# Patient Record
Sex: MALE | Race: OTHER | NOT HISPANIC OR LATINO | Employment: FULL TIME | URBAN - METROPOLITAN AREA
[De-identification: names, ages, dates, MRNs, and addresses within clinical notes are randomized per-mention and may not be internally consistent; named-entity substitution may affect disease eponyms.]

---

## 2017-01-13 ENCOUNTER — OFFICE VISIT (OUTPATIENT)
Dept: MEDICAL GROUP | Facility: PHYSICIAN GROUP | Age: 37
End: 2017-01-13
Payer: COMMERCIAL

## 2017-01-13 VITALS
HEART RATE: 72 BPM | OXYGEN SATURATION: 96 % | HEIGHT: 67 IN | RESPIRATION RATE: 16 BRPM | TEMPERATURE: 98.4 F | BODY MASS INDEX: 29.82 KG/M2 | WEIGHT: 190 LBS | DIASTOLIC BLOOD PRESSURE: 88 MMHG | SYSTOLIC BLOOD PRESSURE: 114 MMHG

## 2017-01-13 DIAGNOSIS — M79.672 PAIN OF LEFT HEEL: ICD-10-CM

## 2017-01-13 DIAGNOSIS — M72.2 PLANTAR FASCIITIS OF LEFT FOOT: ICD-10-CM

## 2017-01-13 PROCEDURE — 99213 OFFICE O/P EST LOW 20 MIN: CPT | Performed by: INTERNAL MEDICINE

## 2017-01-13 NOTE — MR AVS SNAPSHOT
"Tej Leigh Santi   2017 3:25 PM   Office Visit   MRN: [id number]    Department:  [facility]   Dept Phone:  [phone]    Description:  Male : 1980   Provider:  Haim Barton D.O.           Reason for Visit     Foot Swelling Lt heel swelling x2 weeks      Allergies as of 2017     No Known Allergies      You were diagnosed with     Pain of left heel   [361980]       Plantar fasciitis of left foot   [613672]         Vital Signs     Blood Pressure Pulse Temperature Respirations Height Weight    114/88 mmHg 72 36.9 °C (98.4 °F) 16 1.689 m (5' 6.5\") 86.183 kg (190 lb)    Body Mass Index Oxygen Saturation Smoking Status             30.21 kg/m2 96% Former Smoker         Basic Information     Date Of Birth Sex Race Ethnicity Preferred Language    1980 Male  Non- English      Problem List              ICD-10-CM Priority Class Noted - Resolved    Gout M10.9   2011 - Present    Perforation 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 Review all of your home medications and newly ordered medications with your provider and/or pharmacist. Follow medication instructions as directed by your provider and/or pharmacist. Please keep your medication list with you and share with your provider. Update the information when medications are discontinued, doses are changed, or new medications (including over-the-counter products) are added; and carry medication information at all times in the event of emergency situations     Allergies:  No Known " Allergies          Medications  Valid as of: January 13, 2017 -  3:58 PM    Generic Name Brand Name Tablet Size Instructions for use    Allopurinol (Tab) ZYLOPRIM 100 MG Take 2 Tabs by mouth every day.        Indomethacin (Cap) INDOCIN 50 MG TAKE 1 CAPSULE BY MOUTH 2 TIMES A DAY WITH MEALS.        .                 Medicines prescribed today were sent to:     [facility][id number] - [place] - [address] [place] [address]    Phone: [phone] Fax: [phone]    Open 24 Hours?: No      Medication refill instructions:       If your prescription bottle indicates you have medication refills left, it is not necessary to call your provider’s office. Please contact your pharmacy and they will refill your medication.    If your prescription bottle indicates you do not have any refills left, you may request refills at any time through one of the following ways: The online [employer] system (except Urgent Care), by calling your provider’s office, or by asking your pharmacy to contact your provider’s office with a refill request. Medication refills are processed only during regular business hours and may not be available until the next business day. Your provider may request additional information or to have a follow-up visit with you prior to refilling your medication.   *Please Note: Medication refills are assigned a new Rx number when refilled electronically. Your pharmacy may indicate that no refills were authorized even though a new prescription for the same medication is available at the pharmacy. Please request the medicine by name with the pharmacy before contacting your provider for a refill.        Instructions    Plantar Fasciitis  Plantar fasciitis is a painful foot condition that affects the heel. It occurs when the band of tissue that connects the toes to the heel bone (plantar fascia) becomes irritated. This can happen after exercising too much or doing other repetitive activities (overuse injury).  The pain from plantar fasciitis can range from mild irritation to severe pain that makes it difficult for you to walk or move. The pain is usually worse in the morning or after you have been sitting or lying down for a while.  CAUSES  This condition may be caused by:  · Standing for long periods of time.  · Wearing shoes that do not fit.  · Doing high-impact activities, including running, aerobics, and ballet.  · Being overweight.  · Having an abnormal way of walking (gait).  · Having tight calf muscles.  · Having high arches in your feet.  · Starting a new athletic activity.  SYMPTOMS  The main symptom of this condition is heel pain. Other symptoms include:  · Pain that gets worse after activity or exercise.  · Pain that is worse in the morning or after resting.  · Pain that goes away after you walk for a few minutes.  DIAGNOSIS  This condition may be diagnosed based on your signs and symptoms. Your health care provider will also do a physical exam to check for:  · A tender area on the bottom of your foot.  · A high arch in your foot.  · Pain when you move your foot.  · Difficulty moving your foot.  You may also need to have imaging studies to confirm the diagnosis. These can include:  · X-rays.  · Ultrasound.  · MRI.  TREATMENT   Treatment for plantar fasciitis depends on the severity of the condition. Your treatment may include:  · Rest, ice, and over-the-counter pain medicines to manage your pain.  · Exercises to stretch your calves and your plantar fascia.  · A splint that holds your foot in a stretched, upward position while you sleep (night splint).  · Physical therapy to relieve symptoms and prevent problems in the future.  · Cortisone injections to relieve severe pain.  · Extracorporeal shockwave therapy (ESWT) to stimulate damaged plantar fascia with electrical impulses. It is often used as a last resort before surgery.  · Surgery, if other treatments have not worked after 12 months.  HOME CARE  INSTRUCTIONS  · Take medicines only as directed by your health care provider.  · Avoid activities that cause pain.  · Roll the bottom of your foot over a bag of ice or a bottle of cold water. Do this for 20 minutes, 3-4 times a day.  · Perform simple stretches as directed by your health care provider.  · Try wearing athletic shoes with air-sole or gel-sole cushions or soft shoe inserts.  · Wear a night splint while sleeping, if directed by your health care provider.  · Keep all follow-up appointments with your health care provider.  PREVENTION   · Do not perform exercises or activities that cause heel pain.  · Consider finding low-impact activities if you continue to have problems.  · Lose weight if you need to.  The best way to prevent plantar fasciitis is to avoid the activities that aggravate your plantar fascia.  SEEK MEDICAL CARE IF:  · Your symptoms do not go away after treatment with home care measures.  · Your pain gets worse.  · Your pain affects your ability to move or do your daily activities.     This information is not intended to replace advice given to you by your health care provider. Make sure you discuss any questions you have with your health care provider.     Document Released: 09/12/2002 Document Revised: 05/03/2016 Document Reviewed: 10/28/2015  [employer] Interactive Patient Education ©2016 [employer]vier Inc.            PoachIt Access Code: [email or web address]  Expires: 1/21/2017  9:31 AM    PoachIt  A secure, online tool to manage your health information     [employer] PoachIt® is a secure, online tool that connects you to your personalized health information from the privacy of your home -- day or night - making it very easy for you to manage your healthcare. Once the activation process is completed, you can even access your medical information using the PoachIt elmer, which is available for free in the Apple Elmer store or Google Play store.     PoachIt provides the following levels of access (as  shown below):   My Chart Features   [facility]  Urgent  Care [facility]  Primary Care  Doctor   Email your healthcare team securely and privately 24/7 X X X    Manage appointments: schedule your next appointment; view details of past/upcoming appointments X      Request prescription refills. X      View recent personal medical records, including lab and immunizations X X X X   View health record, including health history, allergies, medications X X X X   Read reports about your outpatient visits, procedures, consult and ER notes X X X X   See your discharge summary, which is a recap of your hospital and/or ER visit that includes your diagnosis, lab results, and care plan. X X       How to register for Kayo technology:  1. Go to  [email or web address].  2. Click on the Sign Up Now box, which takes you to the New Member Sign Up page. You will need to provide the following information:  a. Enter your Kayo technology Access Code exactly as it appears at the top of this page. (You will not need to use this code after you’ve completed the sign-up process. If you do not sign up before the expiration date, you must request a new code.)   b. Enter your date of birth.   c. Enter your home email address.   d. Click Submit, and follow the next screen’s instructions.  3. Create a Kayo technology ID. This will be your Kayo technology login ID and cannot be changed, so think of one that is secure and easy to remember.  4. Create a Kayo technology password. You can change your password at any time.  5. Enter your Password Reset Question and Answer. This can be used at a later time if you forget your password.   6. Enter your e-mail address. This allows you to receive e-mail notifications when new information is available in Kayo technology.  7. Click Sign Up. You can now view your health information.    For assistance activating your Kayo technology account, call [phone]

## 2017-01-13 NOTE — PATIENT INSTRUCTIONS
Plantar Fasciitis  Plantar fasciitis is a painful foot condition that affects the heel. It occurs when the band of tissue that connects the toes to the heel bone (plantar fascia) becomes irritated. This can happen after exercising too much or doing other repetitive activities (overuse injury). The pain from plantar fasciitis can range from mild irritation to severe pain that makes it difficult for you to walk or move. The pain is usually worse in the morning or after you have been sitting or lying down for a while.  CAUSES  This condition may be caused by:  · Standing for long periods of time.  · Wearing shoes that do not fit.  · Doing high-impact activities, including running, aerobics, and ballet.  · Being overweight.  · Having an abnormal way of walking (gait).  · Having tight calf muscles.  · Having high arches in your feet.  · Starting a new athletic activity.  SYMPTOMS  The main symptom of this condition is heel pain. Other symptoms include:  · Pain that gets worse after activity or exercise.  · Pain that is worse in the morning or after resting.  · Pain that goes away after you walk for a few minutes.  DIAGNOSIS  This condition may be diagnosed based on your signs and symptoms. Your health care provider will also do a physical exam to check for:  · A tender area on the bottom of your foot.  · A high arch in your foot.  · Pain when you move your foot.  · Difficulty moving your foot.  You may also need to have imaging studies to confirm the diagnosis. These can include:  · X-rays.  · Ultrasound.  · MRI.  TREATMENT   Treatment for plantar fasciitis depends on the severity of the condition. Your treatment may include:  · Rest, ice, and over-the-counter pain medicines to manage your pain.  · Exercises to stretch your calves and your plantar fascia.  · A splint that holds your foot in a stretched, upward position while you sleep (night splint).  · Physical therapy to relieve symptoms and prevent problems in the  future.  · Cortisone injections to relieve severe pain.  · Extracorporeal shockwave therapy (ESWT) to stimulate damaged plantar fascia with electrical impulses. It is often used as a last resort before surgery.  · Surgery, if other treatments have not worked after 12 months.  HOME CARE INSTRUCTIONS  · Take medicines only as directed by your health care provider.  · Avoid activities that cause pain.  · Roll the bottom of your foot over a bag of ice or a bottle of cold water. Do this for 20 minutes, 3-4 times a day.  · Perform simple stretches as directed by your health care provider.  · Try wearing athletic shoes with air-sole or gel-sole cushions or soft shoe inserts.  · Wear a night splint while sleeping, if directed by your health care provider.  · Keep all follow-up appointments with your health care provider.  PREVENTION   · Do not perform exercises or activities that cause heel pain.  · Consider finding low-impact activities if you continue to have problems.  · Lose weight if you need to.  The best way to prevent plantar fasciitis is to avoid the activities that aggravate your plantar fascia.  SEEK MEDICAL CARE IF:  · Your symptoms do not go away after treatment with home care measures.  · Your pain gets worse.  · Your pain affects your ability to move or do your daily activities.     This information is not intended to replace advice given to you by your health care provider. Make sure you discuss any questions you have with your health care provider.     Document Released: 09/12/2002 Document Revised: 05/03/2016 Document Reviewed: 10/28/2015  [employer] Interactive Patient Education ©2016 [employer]

## 2017-01-13 NOTE — PROGRESS NOTES
"Subjective:   Tej Hancock is a 36 y.o. male here today for multiple problems as listed below.      Patient complains of left heel pain that started about 2 weeks ago. Pain is 7 out of 10 in severity and worse in the mornings after getting out of bed. Throughout the day pain is worse when he puts more weight on the heel. No history of traumatic injury. Reports that it feels like the heel swollen but he does not see any increased size in the heel. No erythema or rash. No warmth of the joint. Does not feel like a gout exacerbation which she has had in the past but on the great toe. His shoes are over one year old. With his current job, he does stand on his feet for long periods of time. Patient has been massaging the heel with some improvement. He has not taken any over-the-counter medications.    Current medicines (including changes today)  Current Outpatient Prescriptions   Medication Sig Dispense Refill   • allopurinol (ZYLOPRIM) 100 MG Tab Take 2 Tabs by mouth every day. 60 Tab 00   • indomethacin (INDOCIN) 50 MG CAPS TAKE 1 CAPSULE BY MOUTH 2 TIMES A DAY WITH MEALS. 60 Cap 0     No current facility-administered medications for this visit.     He  has a past medical history of GOUT (12/9/2011) and Hyperlipidemia. He also has no past medical history of Hypertension.    ROS   No chest pain, no shortness of breath, no abdominal pain, no diarrhea/constipation, no urinary symptoms.     Objective:     Blood pressure 114/88, pulse 72, temperature 36.9 °C (98.4 °F), resp. rate 16, height 1.689 m (5' 6.5\"), weight 86.183 kg (190 lb), SpO2 96 %. Body mass index is 30.21 kg/(m^2).   Physical Exam:  Alert, oriented in no acute distress.  Eye contact is good, speech goal directed, affect calm  HEENT: conjunctiva non-injected, sclera non-icteric.  Ext: no edema, color normal, vascularity normal, temperature normal, no tenderness to palpation of heel. Increased heel pain with left foot flexion    Assessment and Plan: "   The following treatment plan was discussed   1. Pain of left heel     2. Plantar fasciitis of left foot       Discussed conservative measures including NSAIDs, ice and rest, and stretching exercises.  More information provided and AVS. Discussed modifying aggravating activities-patient will be changing job duties in the next few weeks which will allow him to be more active and not standing on his feet for long periods of time. He will also be getting new shoes provided by his employment. Also discussed mechanical therapies such as taping and arch support when obtaining his new shoes. Consider podiatry referral if symptoms not improving or persisting.    Followup: Return if symptoms worsen or fail to improve, for follow-up with PCP.         Please note that dictation has been dictated using voice recognition soft ware. I have made every reasonable attempt to correct obvious errors, but I expect that there are errors of grammar and possibly content that I did not discover before finalizing the note.

## 2017-07-07 ENCOUNTER — OFFICE VISIT (OUTPATIENT)
Dept: MEDICAL GROUP | Facility: PHYSICIAN GROUP | Age: 37
End: 2017-07-07
Payer: COMMERCIAL

## 2017-07-07 VITALS
TEMPERATURE: 98.1 F | HEART RATE: 90 BPM | HEIGHT: 67 IN | BODY MASS INDEX: 29.13 KG/M2 | WEIGHT: 185.63 LBS | OXYGEN SATURATION: 94 % | DIASTOLIC BLOOD PRESSURE: 74 MMHG | RESPIRATION RATE: 16 BRPM | SYSTOLIC BLOOD PRESSURE: 122 MMHG

## 2017-07-07 DIAGNOSIS — M10.9 GOUT OF FOOT, UNSPECIFIED CAUSE, UNSPECIFIED CHRONICITY, UNSPECIFIED LATERALITY: ICD-10-CM

## 2017-07-07 DIAGNOSIS — M72.2 PLANTAR FASCIITIS, BILATERAL: ICD-10-CM

## 2017-07-07 DIAGNOSIS — B35.4 TINEA CORPORIS: ICD-10-CM

## 2017-07-07 DIAGNOSIS — E78.00 PURE HYPERCHOLESTEROLEMIA: ICD-10-CM

## 2017-07-07 DIAGNOSIS — M10.9 GOUT, UNSPECIFIED CAUSE, UNSPECIFIED CHRONICITY, UNSPECIFIED SITE: ICD-10-CM

## 2017-07-07 PROCEDURE — 99214 OFFICE O/P EST MOD 30 MIN: CPT | Performed by: FAMILY MEDICINE

## 2017-07-07 RX ORDER — CLOTRIMAZOLE AND BETAMETHASONE DIPROPIONATE 10; .64 MG/G; MG/G
CREAM TOPICAL
Qty: 1 TUBE | Refills: 2 | Status: SHIPPED | OUTPATIENT
Start: 2017-07-07 | End: 2017-12-27

## 2017-07-07 RX ORDER — ALLOPURINOL 100 MG/1
100 TABLET ORAL DAILY
Qty: 90 TAB | Refills: 3 | Status: SHIPPED | OUTPATIENT
Start: 2017-07-07 | End: 2017-09-13

## 2017-07-07 NOTE — MR AVS SNAPSHOT
"        Tej Abraham Santi   2017 4:20 PM   Office Visit   MRN: [id number]    Department:  [facility]   Dept Phone:  [phone]    Description:  Male : 1980   Provider:  Kira Long M.D.           Reason for Visit     Other requesting labs to check urice acid     Rash left leg & foot       Allergies as of 2017     No Known Allergies      You were diagnosed with     Tinea corporis   [670084]       Plantar fasciitis, bilateral   [392049]       Gout of foot, unspecified cause, unspecified chronicity, unspecified laterality   [8927593]       Pure hypercholesterolemia   [272.0.ICD-9-CM]       Gout, unspecified cause, unspecified chronicity, unspecified site   [8768603]         Vital Signs     Blood Pressure Pulse Temperature Respirations Height Weight    122/74 mmHg 90 36.7 °C (98.1 °F) 16 1.708 m (5' 7.24\") 84.2 kg (185 lb 10 oz)    Body Mass Index Oxygen Saturation Smoking Status             28.86 kg/m2 94% Former Smoker         Basic Information     Date Of Birth Sex Race Ethnicity Preferred Language    1980 Male  Non- English      Your appointments     2018  4:00 PM   Established Patient with Kira Long M.D.   [facility] (--)    [address]  [address]  [facility] [address] [phone]           You will be receiving a confirmation call a few days before your appointment from our automated call confirmation system.              Problem List              ICD-10-CM Priority Class Noted - Resolved    Gout M10.9   2011 - Present    Hyperlipidemia E78.5   10/22/2015 - Present    Tinea corporis B35.4   2017 - Present    Plantar fasciitis, bilateral M72.2   2017 - Present      Health Maintenance        Date Due Completion Dates    IMM INFLUENZA (1) 2017 10/15/2015    IMM DTaP/Tdap/Td Vaccine (2 - Td) 2025            Current Immunizations     Influenza TIV (IM) 10/15/2015    Tdap Vaccine 2015      Below and/or " attached are the medications your provider expects you to take. Review all of your home medications and newly ordered medications with your provider and/or pharmacist. Follow medication instructions as directed by your provider and/or pharmacist. Please keep your medication list with you and share with your provider. Update the information when medications are discontinued, doses are changed, or new medications (including over-the-counter products) are added; and carry medication information at all times in the event of emergency situations     Allergies:  No Known Allergies          Medications  Valid as of: July 07, 2017 -  4:50 PM    Generic Name Brand Name Tablet Size Instructions for use    Allopurinol (Tab) ZYLOPRIM 100 MG Take 1 Tab by mouth every day.        Clotrimazole-Betamethasone (Cream) LOTRISONE 1-0.05 % Apply thin layer to rashes twice a day until resolved.        Indomethacin (Cap) INDOCIN 50 MG TAKE 1 CAPSULE BY MOUTH 2 TIMES A DAY WITH MEALS.        .                 Medicines prescribed today were sent to:     [id number] - [place] - [address]    [address] [place] [address]    Phone: [phone] Fax: [phone]    Open 24 Hours?: No      Medication refill instructions:       If your prescription bottle indicates you have medication refills left, it is not necessary to call your provider’s office. Please contact your pharmacy and they will refill your medication.    If your prescription bottle indicates you do not have any refills left, you may request refills at any time through one of the following ways: The online [employer] system (except Urgent Care), by calling your provider’s office, or by asking your pharmacy to contact your provider’s office with a refill request. Medication refills are processed only during regular business hours and may not be available until the next business day. Your provider may request additional information or to have a follow-up visit with you prior to  refilling your medication.   *Please Note: Medication refills are assigned a new Rx number when refilled electronically. Your pharmacy may indicate that no refills were authorized even though a new prescription for the same medication is available at the pharmacy. Please request the medicine by name with the pharmacy before contacting your provider for a refill.        Your To Do List     Future Labs/Procedures Complete By Expires    LIPID PROFILE  As directed 7/8/2018    URIC ACID  As directed 7/8/2018      Instructions    Body Ringworm  Ringworm (tinea corporis) is a fungal infection of the skin on the body. This infection is not caused by worms, but is actually caused by a fungus. Fungus normally lives on the top of your skin and can be useful. However, in the case of ringworms, the fungus grows out of control and causes a skin infection. It can involve any area of skin on the body and can spread easily from one person to another (contagious). Ringworm is a common problem for children, but it can affect adults as well. Ringworm is also often found in athletes, especially wrestlers who share equipment and mats.   CAUSES   Ringworm of the body is caused by a fungus called dermatophyte. It can spread by:  · Touching other people who are infected.  · Touching infected pets.  · Touching or sharing objects that have been in contact with the infected person or pet (hats, taveras, towels, clothing, sports equipment).  SYMPTOMS   · Itchy, raised red spots and bumps on the skin.  · Ring-shaped rash.  · Redness near the border of the rash with a clear center.  · Dry and scaly skin on or around the rash.  Not every person develops a ring-shaped rash. Some develop only the red, scaly patches.  DIAGNOSIS   Most often, ringworm can be diagnosed by performing a skin exam. Your caregiver may choose to take a skin scraping from the affected area. The sample will be examined under the microscope to see if the fungus is present.      TREATMENT   Body ringworm may be treated with a topical antifungal cream or ointment. Sometimes, an antifungal shampoo that can be used on your body is prescribed. You may be prescribed antifungal medicines to take by mouth if your ringworm is severe, keeps coming back, or lasts a long time.   HOME CARE INSTRUCTIONS   · Only take over-the-counter or prescription medicines as directed by your caregiver.  · Wash the infected area and dry it completely before applying your cream or ointment.  · When using antifungal shampoo to treat the ringworm, leave the shampoo on the body for 3-5 minutes before rinsing.     · Wear loose clothing to stop clothes from rubbing and irritating the rash.  · Wash or change your bed sheets every night while you have the rash.  · Have your pet treated by your  if it has the same infection.  To prevent ringworm:   · Practice good hygiene.  · Wear sandals or shoes in public places and showers.  · Do not share personal items with others.  · Avoid touching red patches of skin on other people.  · Avoid touching pets that have bald spots or wash your hands after doing so.  SEEK MEDICAL CARE IF:   · Your rash continues to spread after 7 days of treatment.  · Your rash is not gone in 4 weeks.  · The area around your rash becomes red, warm, tender, and swollen.     This information is not intended to replace advice given to you by your health care provider. Make sure you discuss any questions you have with your health care provider.     Document Released: 12/15/2001 Document Revised: 09/11/2013 Document Reviewed: 07/01/2013  [employer] Interactive Patient Education ©2016 Elsevier Inc.            MetaPackt Access Code: [email or web address]  Expires: 7/14/2017  4:42 PM    [employer]  A secure, online tool to manage your health information     [employer]® is a secure, online tool that connects you to your personalized health information from the privacy of your home -- day or night -  making it very easy for you to manage your healthcare. Once the activation process is completed, you can even access your medical information using the MobilePro elmer, which is available for free in the Apple Elmer store or Google Play store.     MobilePro provides the following levels of access (as shown below):   My Chart Features   [facility] Doctor [facility]  Specialists [facility]  Urgent  Care Non-[facility]  Primary Care  Doctor   Email your healthcare team securely and privately 24/7 X X X    Manage appointments: schedule your next appointment; view details of past/upcoming appointments X      Request prescription refills. X      View recent personal medical records, including lab and immunizations X X X X   View health record, including health history, allergies, medications X X X X   Read reports about your outpatient visits, procedures, consult and ER notes X X X X   See your discharge summary, which is a recap of your hospital and/or ER visit that includes your diagnosis, lab results, and care plan. X X       How to register for MobilePro:  1. Go to  [email or web address].  2. Click on the Sign Up Now box, which takes you to the New Member Sign Up page. You will need to provide the following information:  a. Enter your MobilePro Access Code exactly as it appears at the top of this page. (You will not need to use this code after you’ve completed the sign-up process. If you do not sign up before the expiration date, you must request a new code.)   b. Enter your date of birth.   c. Enter your home email address.   d. Click Submit, and follow the next screen’s instructions.  3. Create a MobilePro ID. This will be your MobilePro login ID and cannot be changed, so think of one that is secure and easy to remember.  4. Create a MobilePro password. You can change your password at any time.  5. Enter your Password Reset Question and Answer. This can be used at a later time if you forget your password.   6. Enter your e-mail  address. This allows you to receive e-mail notifications when new information is available in BusyFlow.  7. Click Sign Up. You can now view your health information.    For assistance activating your BusyFlow account, call [phone]

## 2017-07-07 NOTE — PATIENT INSTRUCTIONS
Body Ringworm  Ringworm (tinea corporis) is a fungal infection of the skin on the body. This infection is not caused by worms, but is actually caused by a fungus. Fungus normally lives on the top of your skin and can be useful. However, in the case of ringworms, the fungus grows out of control and causes a skin infection. It can involve any area of skin on the body and can spread easily from one person to another (contagious). Ringworm is a common problem for children, but it can affect adults as well. Ringworm is also often found in athletes, especially wrestlers who share equipment and mats.   CAUSES   Ringworm of the body is caused by a fungus called dermatophyte. It can spread by:  · Touching other people who are infected.  · Touching infected pets.  · Touching or sharing objects that have been in contact with the infected person or pet (hats, taveras, towels, clothing, sports equipment).  SYMPTOMS   · Itchy, raised red spots and bumps on the skin.  · Ring-shaped rash.  · Redness near the border of the rash with a clear center.  · Dry and scaly skin on or around the rash.  Not every person develops a ring-shaped rash. Some develop only the red, scaly patches.  DIAGNOSIS   Most often, ringworm can be diagnosed by performing a skin exam. Your caregiver may choose to take a skin scraping from the affected area. The sample will be examined under the microscope to see if the fungus is present.   TREATMENT   Body ringworm may be treated with a topical antifungal cream or ointment. Sometimes, an antifungal shampoo that can be used on your body is prescribed. You may be prescribed antifungal medicines to take by mouth if your ringworm is severe, keeps coming back, or lasts a long time.   HOME CARE INSTRUCTIONS   · Only take over-the-counter or prescription medicines as directed by your caregiver.  · Wash the infected area and dry it completely before applying your cream or ointment.  · When using antifungal shampoo to  treat the ringworm, leave the shampoo on the body for 3-5 minutes before rinsing.     · Wear loose clothing to stop clothes from rubbing and irritating the rash.  · Wash or change your bed sheets every night while you have the rash.  · Have your pet treated by your  if it has the same infection.  To prevent ringworm:   · Practice good hygiene.  · Wear sandals or shoes in public places and showers.  · Do not share personal items with others.  · Avoid touching red patches of skin on other people.  · Avoid touching pets that have bald spots or wash your hands after doing so.  SEEK MEDICAL CARE IF:   · Your rash continues to spread after 7 days of treatment.  · Your rash is not gone in 4 weeks.  · The area around your rash becomes red, warm, tender, and swollen.     This information is not intended to replace advice given to you by your health care provider. Make sure you discuss any questions you have with your health care provider.     Document Released: 12/15/2001 Document Revised: 09/11/2013 Document Reviewed: 07/01/2013  Elsevier Interactive Patient Education ©2016 Elsevier Inc.

## 2017-07-08 NOTE — ASSESSMENT & PLAN NOTE
"Patient has had a rash on the top of his right foot for quite a while. He tells me that it has been there \"for months.\" Recently, he noticed a similar rash on his right shin. It is itchy at times. No other rashes. He mentions that his feet are sweaty at work and there is sometimes an odor. He has not tried any treatments yet.  "

## 2017-07-08 NOTE — PROGRESS NOTES
"Tej Hancock is a 36 y.o. male here to establish care and discuss rash.    HPI:  Tej is a pleasant 36-year-old male here to establish care. Our office is down the street from his home. 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    Tinea corporis  Patient has had a rash on the top of his right foot for quite a while. He tells me that it has been there \"for months.\" Recently, he noticed a similar rash on his right shin. It is itchy at times. No other rashes. He mentions that his feet are sweaty at work and there is sometimes an odor. He has not tried any treatments yet.    Plantar fasciitis, bilateral  Patient has a several day history of bilateral heel pain. Worse in the morning. Improves throughout the day. Also worse with activity. Patient has tried over-the-counter medications without good results.    Gout  Stable. Currently taking allopurinol 100 mg every once in a while. He tells me that instead of taking it daily, he will take it when he feels \"tingling in my joints.\" Denies recent flare-ups. Denies side effects from medication.    Hyperlipidemia  Noted on previous lab results. Not on medications. No personal history of heart attack or stroke.    Current medicines (including changes today)  Current Outpatient Prescriptions   Medication Sig Dispense Refill   • clotrimazole-betamethasone (LOTRISONE) 1-0.05 % Cream Apply thin layer to rashes twice a day until resolved. 1 Tube 2   • allopurinol (ZYLOPRIM) 100 MG Tab Take 1 Tab by mouth every day. 90 Tab 3   • indomethacin (INDOCIN) 50 MG CAPS TAKE 1 CAPSULE BY MOUTH 2 TIMES A DAY WITH MEALS. 60 Cap 0     No current facility-administered medications for this visit.     He  has a past medical history of GOUT (12/9/2011) and Hyperlipidemia. He also has no past medical history of Hypertension.  He  has past surgical history that includes open reduction and tympanoplasty (8/28/2012).  Social History   Substance Use Topics   • Smoking status: " "Former Smoker -- 0.50 packs/day for 20 years     Types: Cigarettes     Quit date: 10/22/2013   • Smokeless tobacco: Never Used      Comment: 1 pack every 2 days   • Alcohol Use: No     Social History     Social History Narrative     Family History   Problem Relation Age of Onset   • Stroke Maternal Grandfather    • Cancer Neg Hx    • Diabetes Neg Hx    • Other Brother    • Other Brother      Family Status   Relation Status Death Age   • Maternal Grandfather     • Brother Alive    • Brother Alive    • Mother Alive    • Father  50     unknown   • Sister Alive    • Son Alive    • Maternal Grandmother     • Paternal Grandmother     • Paternal Grandfather       ROS  Constitutional: Negative for fever, chills and malaise/fatigue.   HENT: Negative for congestion.    Eyes: Negative for pain.   Respiratory: Negative for cough and shortness of breath.    Cardiovascular: Negative for leg swelling.   Gastrointestinal: Negative for nausea, vomiting, abdominal pain and diarrhea.   Genitourinary: Negative for dysuria and hematuria.   Skin: See HPI.  Neurological: Negative for dizziness, focal weakness and headaches.   Endo/Heme/Allergies: Does not bruise/bleed easily.   Psychiatric/Behavioral: Negative for depression.  The patient is not nervous/anxious.       Objective:     Physical Exam:  Blood pressure 122/74, pulse 90, temperature 36.7 °C (98.1 °F), resp. rate 16, height 1.708 m (5' 7.24\"), weight 84.2 kg (185 lb 10 oz), SpO2 94 %. Body mass index is 28.86 kg/(m^2).  Constitutional: Alert, no distress.  Skin: Warm, dry, good turgor, on dorsum of right foot there is a quarter-sized erythematous rash with elevated borders, on right shin there is a palm-sized maculopapular rash.  Eye: Equal, round and reactive, conjunctiva clear, lids normal.  ENMT: TM's clear bilaterally, lips without lesions, good dentition, oropharynx clear.  Neck: Trachea midline, no masses, no thyromegaly. No cervical " or supraclavicular lymphadenopathy.  Respiratory: Unlabored respiratory effort, lungs clear to auscultation, no wheezes, no ronchi.  Cardiovascular: Normal S1, S2, no murmur, no edema.  Abdomen: Soft, non-tender, no masses, no hepatosplenomegaly.  Psych: Alert and oriented x3, normal affect and mood.    Assessment and Plan:     1. Tinea corporis  Fungal-appearing rash on exam. Anti-fungal with topical steroid prescribed. Continue to monitor.  - clotrimazole-betamethasone (LOTRISONE) 1-0.05 % Cream; Apply thin layer to rashes twice a day until resolved.  Dispense: 1 Tube; Refill: 2    2. Plantar fasciitis, bilateral  New problem for the patient. Most likely plantar fasciitis given the symptoms. Will refer to physical therapy or podiatry if needed. Gave the patient handout with regard to ice and stretching exercises. Monitor.    3. Gout of foot, unspecified cause, unspecified chronicity, unspecified laterality  Chronic and stable. No recent flares. Check uric acid. Depending on results, will encourage patient to take daily.  - URIC ACID; Future  - allopurinol (ZYLOPRIM) 100 MG Tab; Take 1 Tab by mouth every day.  Dispense: 90 Tab; Refill: 3    4. Pure hypercholesterolemia  Noted on past lab results. Recheck and recalculate ASCVD risk.  - LIPID PROFILE; Future    Records reviewed in Epic.  Followup: Return in about 6 months (around 1/7/2018) for f/u gout and rash, short.         PLEASE NOTE: This dictation was created using voice recognition software. I have made every reasonable attempt to correct obvious errors, but I expect that there are errors of grammar and possibly content that I did not discover before finalizing the note.

## 2017-07-08 NOTE — ASSESSMENT & PLAN NOTE
"Stable. Currently taking allopurinol 100 mg every once in a while. He tells me that instead of taking it daily, he will take it when he feels \"tingling in my joints.\" Denies recent flare-ups. Denies side effects from medication.  "

## 2017-07-08 NOTE — ASSESSMENT & PLAN NOTE
Patient has a several day history of bilateral heel pain. Worse in the morning. Improves throughout the day. Also worse with activity. Patient has tried over-the-counter medications without good results.

## 2017-07-31 ENCOUNTER — HOSPITAL ENCOUNTER (OUTPATIENT)
Dept: LAB | Facility: MEDICAL CENTER | Age: 37
End: 2017-07-31
Attending: FAMILY MEDICINE
Payer: COMMERCIAL

## 2017-07-31 ENCOUNTER — PATIENT MESSAGE (OUTPATIENT)
Dept: MEDICAL GROUP | Facility: PHYSICIAN GROUP | Age: 37
End: 2017-07-31

## 2017-07-31 DIAGNOSIS — M10.9 GOUT OF FOOT, UNSPECIFIED CAUSE, UNSPECIFIED CHRONICITY, UNSPECIFIED LATERALITY: ICD-10-CM

## 2017-07-31 DIAGNOSIS — E78.00 PURE HYPERCHOLESTEROLEMIA: ICD-10-CM

## 2017-07-31 LAB
CHOLEST SERPL-MCNC: 203 MG/DL (ref 100–199)
HDLC SERPL-MCNC: 47 MG/DL
LDLC SERPL CALC-MCNC: 120 MG/DL
TRIGL SERPL-MCNC: 179 MG/DL (ref 0–149)
URATE SERPL-MCNC: 8.9 MG/DL (ref 2.5–8.3)

## 2017-07-31 PROCEDURE — 36415 COLL VENOUS BLD VENIPUNCTURE: CPT

## 2017-07-31 PROCEDURE — 80061 LIPID PANEL: CPT

## 2017-07-31 PROCEDURE — 84550 ASSAY OF BLOOD/URIC ACID: CPT

## 2017-09-13 RX ORDER — ALLOPURINOL 100 MG/1
200 TABLET ORAL DAILY
Qty: 180 TAB | Refills: 3 | Status: SHIPPED | OUTPATIENT
Start: 2017-09-13 | End: 2018-09-09 | Stop reason: SDUPTHER

## 2017-12-27 ENCOUNTER — OFFICE VISIT (OUTPATIENT)
Dept: MEDICAL GROUP | Facility: PHYSICIAN GROUP | Age: 37
End: 2017-12-27
Payer: COMMERCIAL

## 2017-12-27 VITALS
HEIGHT: 67 IN | TEMPERATURE: 98.7 F | RESPIRATION RATE: 14 BRPM | DIASTOLIC BLOOD PRESSURE: 80 MMHG | SYSTOLIC BLOOD PRESSURE: 116 MMHG | BODY MASS INDEX: 29.35 KG/M2 | WEIGHT: 187 LBS | OXYGEN SATURATION: 95 % | HEART RATE: 82 BPM

## 2017-12-27 DIAGNOSIS — M10.9 GOUT OF FOOT, UNSPECIFIED CAUSE, UNSPECIFIED CHRONICITY, UNSPECIFIED LATERALITY: ICD-10-CM

## 2017-12-27 DIAGNOSIS — Z13.1 SCREENING FOR DIABETES MELLITUS: ICD-10-CM

## 2017-12-27 DIAGNOSIS — E78.00 PURE HYPERCHOLESTEROLEMIA: ICD-10-CM

## 2017-12-27 PROBLEM — B35.4 TINEA CORPORIS: Status: RESOLVED | Noted: 2017-07-07 | Resolved: 2017-12-27

## 2017-12-27 PROCEDURE — 99213 OFFICE O/P EST LOW 20 MIN: CPT | Performed by: FAMILY MEDICINE

## 2017-12-27 ASSESSMENT — PAIN SCALES - GENERAL: PAINLEVEL: NO PAIN

## 2017-12-27 ASSESSMENT — PATIENT HEALTH QUESTIONNAIRE - PHQ9: CLINICAL INTERPRETATION OF PHQ2 SCORE: 0

## 2017-12-28 NOTE — ASSESSMENT & PLAN NOTE
Most recent lab results show improvement. Not on medications. No personal history of heart attack or stroke.

## 2017-12-28 NOTE — PROGRESS NOTES
"Subjective:   Tej Hancock is a 37 y.o. male here today for follow-up gout and high cholesterol.    Gout  Stable. Currently taking allopurinol 200 mg every day. His most recent flare-up was a couple of weeks ago, but was not severe enough for him to need to take additional medication. The frequency of his attacks has been decreasing. Denies side effects from medication.    Most recent uric acid was 8.9 in July.    Pure hypercholesterolemia  Most recent lab results show improvement. Not on medications. No personal history of heart attack or stroke.     Current medicines (including changes today)  Current Outpatient Prescriptions   Medication Sig Dispense Refill   • allopurinol (ZYLOPRIM) 100 MG Tab Take 2 Tabs by mouth every day. 180 Tab 3   • indomethacin (INDOCIN) 50 MG CAPS TAKE 1 CAPSULE BY MOUTH 2 TIMES A DAY WITH MEALS. 60 Cap 0     No current facility-administered medications for this visit.      He  has a past medical history of GOUT (12/9/2011) and Hyperlipidemia. He also has no past medical history of Hypertension.    ROS   See HPI. No chest pain, no shortness of breath, no abdominal pain.     Objective:     Physical Exam:  Blood pressure 116/80, pulse 82, temperature 37.1 °C (98.7 °F), resp. rate 14, height 1.708 m (5' 7.24\"), weight 84.8 kg (187 lb), SpO2 95 %. Body mass index is 29.08 kg/m².   Constitutional: Alert, no distress.  Skin: Warm, dry, good turgor, no rashes in visible areas.  Eye: Conjunctiva clear, lids normal.  ENMT: Lips without lesions, good dentition, oropharynx clear.  Neck: Trachea midline, no masses, no thyromegaly.  Respiratory: Unlabored respiratory effort, no cough.  MSK: Normal gait. DUFF.  Psych: Alert and oriented x3, normal affect and mood.    Assessment and Plan:     1. Gout of foot, unspecified cause, unspecified chronicity, unspecified laterality  Chronic and stable. Continue prophylactic medication. Patient declined to increase dose of allopurinol at this time. " Recheck uric acid level. Encouraged patient to avoid dietary triggers.  - URIC ACID; Future    2. Pure hypercholesterolemia  Chronic and stable. Continue lifestyle modifications. Labwork as indicated. Monitor.  - LIPID PROFILE; Future    3. Screening for diabetes mellitus  Fasting glucose ordered.  - COMP METABOLIC PANEL; Future    Followup: Return in about 6 months (around 6/27/2018) for f/u gout and cholesterol labs, short.         PLEASE NOTE: This dictation was created using voice recognition software. I have made every reasonable attempt to correct obvious errors, but I expect that there are errors of grammar and possibly content that I did not discover before finalizing the note.

## 2017-12-28 NOTE — ASSESSMENT & PLAN NOTE
Stable. Currently taking allopurinol 200 mg every day. His most recent flare-up was a couple of weeks ago, but was not severe enough for him to need to take additional medication. The frequency of his attacks has been decreasing. Denies side effects from medication.    Most recent uric acid was 8.9 in July.

## 2018-04-06 ENCOUNTER — HOSPITAL ENCOUNTER (OUTPATIENT)
Dept: LAB | Facility: MEDICAL CENTER | Age: 38
End: 2018-04-06
Attending: FAMILY MEDICINE
Payer: COMMERCIAL

## 2018-04-06 DIAGNOSIS — M10.9 GOUT OF FOOT, UNSPECIFIED CAUSE, UNSPECIFIED CHRONICITY, UNSPECIFIED LATERALITY: ICD-10-CM

## 2018-04-06 DIAGNOSIS — Z13.1 SCREENING FOR DIABETES MELLITUS: ICD-10-CM

## 2018-04-06 DIAGNOSIS — E78.00 PURE HYPERCHOLESTEROLEMIA: ICD-10-CM

## 2018-04-06 LAB
ALBUMIN SERPL BCP-MCNC: 4.4 G/DL (ref 3.2–4.9)
ALBUMIN/GLOB SERPL: 1.6 G/DL
ALP SERPL-CCNC: 52 U/L (ref 30–99)
ALT SERPL-CCNC: 33 U/L (ref 2–50)
ANION GAP SERPL CALC-SCNC: 9 MMOL/L (ref 0–11.9)
AST SERPL-CCNC: 22 U/L (ref 12–45)
BILIRUB SERPL-MCNC: 0.6 MG/DL (ref 0.1–1.5)
BUN SERPL-MCNC: 13 MG/DL (ref 8–22)
CALCIUM SERPL-MCNC: 9.4 MG/DL (ref 8.5–10.5)
CHLORIDE SERPL-SCNC: 103 MMOL/L (ref 96–112)
CHOLEST SERPL-MCNC: 192 MG/DL (ref 100–199)
CO2 SERPL-SCNC: 27 MMOL/L (ref 20–33)
CREAT SERPL-MCNC: 1.05 MG/DL (ref 0.5–1.4)
GLOBULIN SER CALC-MCNC: 2.8 G/DL (ref 1.9–3.5)
GLUCOSE SERPL-MCNC: 93 MG/DL (ref 65–99)
HDLC SERPL-MCNC: 39 MG/DL
LDLC SERPL CALC-MCNC: 101 MG/DL
POTASSIUM SERPL-SCNC: 3.7 MMOL/L (ref 3.6–5.5)
PROT SERPL-MCNC: 7.2 G/DL (ref 6–8.2)
SODIUM SERPL-SCNC: 139 MMOL/L (ref 135–145)
TRIGL SERPL-MCNC: 262 MG/DL (ref 0–149)
URATE SERPL-MCNC: 7 MG/DL (ref 2.5–8.3)

## 2018-04-06 PROCEDURE — 84550 ASSAY OF BLOOD/URIC ACID: CPT

## 2018-04-06 PROCEDURE — 36415 COLL VENOUS BLD VENIPUNCTURE: CPT

## 2018-04-06 PROCEDURE — 80061 LIPID PANEL: CPT

## 2018-04-06 PROCEDURE — 80053 COMPREHEN METABOLIC PANEL: CPT

## 2018-06-29 ENCOUNTER — APPOINTMENT (OUTPATIENT)
Dept: MEDICAL GROUP | Facility: PHYSICIAN GROUP | Age: 38
End: 2018-06-29
Payer: COMMERCIAL

## 2018-07-25 ENCOUNTER — OFFICE VISIT (OUTPATIENT)
Dept: MEDICAL GROUP | Facility: PHYSICIAN GROUP | Age: 38
End: 2018-07-25
Payer: COMMERCIAL

## 2018-07-25 VITALS
OXYGEN SATURATION: 95 % | WEIGHT: 185 LBS | DIASTOLIC BLOOD PRESSURE: 68 MMHG | HEIGHT: 67 IN | BODY MASS INDEX: 29.03 KG/M2 | TEMPERATURE: 97.8 F | RESPIRATION RATE: 16 BRPM | HEART RATE: 85 BPM | SYSTOLIC BLOOD PRESSURE: 106 MMHG

## 2018-07-25 DIAGNOSIS — E78.00 PURE HYPERCHOLESTEROLEMIA: ICD-10-CM

## 2018-07-25 DIAGNOSIS — Z00.00 ENCOUNTER FOR PREVENTATIVE ADULT HEALTH CARE EXAMINATION: ICD-10-CM

## 2018-07-25 DIAGNOSIS — Z87.39 PERSONAL HISTORY OF GOUT: ICD-10-CM

## 2018-07-25 PROCEDURE — 99395 PREV VISIT EST AGE 18-39: CPT | Performed by: FAMILY MEDICINE

## 2018-07-25 ASSESSMENT — PAIN SCALES - GENERAL: PAINLEVEL: NO PAIN

## 2018-07-25 NOTE — PROGRESS NOTES
Subjective:     CC:   Chief Complaint   Patient presents with   • Shoulder Pain     L shoulder pain with movement    • Gout     follow up doing better     HPI:   Tej Hancock is a 37 y.o. male who presents for an annual exam.  He is feeling well and has no complaints.    Last colonoscopy: N/A  Last Tdap: 1/2015   Hx STDs: No  Recent STD test: N/A  Regular exercise: No  Diet: Healthy    He  has a past medical history of GOUT (12/9/2011) and Hyperlipidemia. He also has no past medical history of Hypertension.  He  has a past surgical history that includes open reduction and tympanoplasty (8/28/2012).     Family History   Problem Relation Age of Onset   • Stroke Maternal Grandfather    • Other Brother    • Other Brother    • Cancer Neg Hx    • Diabetes Neg Hx      Social History   Substance Use Topics   • Smoking status: Former Smoker     Packs/day: 0.50     Years: 20.00     Types: Cigarettes     Quit date: 10/22/2013   • Smokeless tobacco: Never Used      Comment: 1 pack every 2 days   • Alcohol use No     Patient Active Problem List    Diagnosis Date Noted   • Plantar fasciitis, bilateral 07/07/2017   • Pure hypercholesterolemia 10/22/2015   • Personal history of gout 12/09/2011     Current Outpatient Prescriptions   Medication Sig Dispense Refill   • allopurinol (ZYLOPRIM) 100 MG Tab Take 2 Tabs by mouth every day. 180 Tab 3   • indomethacin (INDOCIN) 50 MG CAPS TAKE 1 CAPSULE BY MOUTH 2 TIMES A DAY WITH MEALS. 60 Cap 0     No current facility-administered medications for this visit.     (including changes today)  Allergies: Patient has no known allergies.    Review of Systems   Constitutional: Negative for fever, chills and malaise/fatigue.   HENT: Negative for congestion.    Eyes: Negative for pain.   Respiratory: Negative for cough and shortness of breath.    Cardiovascular: Negative for leg swelling.   Gastrointestinal: Negative for nausea, vomiting, abdominal pain and diarrhea.   Genitourinary: Negative  "for dysuria and hematuria.   Skin: Negative for rash.   Neurological: Negative for dizziness, focal weakness and headaches.   Endo/Heme/Allergies: Does not bruise/bleed easily.   Psychiatric/Behavioral: Negative for depression.  The patient is not nervous/anxious.      Objective:     /68   Pulse 85   Temp 36.6 °C (97.8 °F)   Resp 16   Ht 1.702 m (5' 7\")   Wt 83.9 kg (185 lb)   SpO2 95%   BMI 28.98 kg/m²   Body mass index is 28.98 kg/m².  Wt Readings from Last 4 Encounters:   07/25/18 83.9 kg (185 lb)   12/27/17 84.8 kg (187 lb)   07/07/17 84.2 kg (185 lb 10 oz)   01/13/17 86.2 kg (190 lb)     Physical Exam:  Constitutional: Well-developed and well-nourished. Not diaphoretic. No distress.   Skin: Skin is warm and dry. No rash noted.  Head: Atraumatic without lesions.  Eyes: Conjunctivae and extraocular motions are normal. Pupils are equal, round, and reactive to light. No scleral icterus.   Ears:  External ears unremarkable. Tympanic membranes clear and intact.  Nose: Nares patent. Septum midline. Turbinates without erythema nor edema. No discharge.   Mouth/Throat: Dentition is good. Tongue normal. Oropharynx is clear and moist. Posterior pharynx without erythema or exudates.  Neck: Supple, trachea midline. Normal range of motion. No thyromegaly present. No lymphadenopathy--cervical or supraclavicular.  Cardiovascular: Regular rate and rhythm, S1 and S2 without murmur, rubs, or gallops.    Chest: Effort normal. Clear to auscultation throughout. No adventitious sounds. No CVA tenderness.  Abdomen: Soft, non tender, and without distention. Active bowel sounds in all four quadrants. No rebound, guarding, masses or HSM.  Extremities: No cyanosis, clubbing, erythema, nor edema. Distal pulses intact and symmetric.   Musculoskeletal: All major joints AROM full in all directions without pain.  Neurological: Alert and oriented x 3. No cranial nerve deficit. 5/5 myotomes.  Psychiatric:  Behavior, mood, and affect " are appropriate.    Assessment and Plan:     1. Encounter for preventative adult health care examination  This is a healthy 37-year-old male here today for a preventative exam. Previous medical history, healthcare maintenance and immunization status reviewed. Patient is up to date. Labwork ordered for next year. See discussion of anticipatory guidance below. Patient will return annually for preventative exams.  COMP METABOLIC PANEL    LIPID PROFILE   2. Personal history of gout  Chronic and stable.  No recent flare-ups on allopurinol 200mg daily.  Will continue.  URIC ACID   3. Pure hypercholesterolemia  Chronic and improving.  Managed with diet.  Continue to monitor.  COMP METABOLIC PANEL    LIPID PROFILE     HCM: Up to date.  Labs per orders.  Vaccinations per orders.  Counseling about diet, supplements, exercise, skin care and safe sex.    Follow-up: Return in about 1 year (around 7/25/2019) for Annual.

## 2018-09-09 DIAGNOSIS — M10.9 GOUT OF FOOT, UNSPECIFIED CAUSE, UNSPECIFIED CHRONICITY, UNSPECIFIED LATERALITY: ICD-10-CM

## 2018-09-10 RX ORDER — ALLOPURINOL 100 MG/1
200 TABLET ORAL DAILY
Qty: 180 TAB | Refills: 3 | Status: SHIPPED | OUTPATIENT
Start: 2018-09-10 | End: 2019-09-06 | Stop reason: SDUPTHER

## 2019-03-23 ENCOUNTER — HOSPITAL ENCOUNTER (OUTPATIENT)
Dept: LAB | Facility: MEDICAL CENTER | Age: 39
End: 2019-03-23
Attending: FAMILY MEDICINE
Payer: COMMERCIAL

## 2019-03-23 DIAGNOSIS — E78.00 PURE HYPERCHOLESTEROLEMIA: ICD-10-CM

## 2019-03-23 DIAGNOSIS — Z00.00 ENCOUNTER FOR PREVENTATIVE ADULT HEALTH CARE EXAMINATION: ICD-10-CM

## 2019-03-23 DIAGNOSIS — Z87.39 PERSONAL HISTORY OF GOUT: ICD-10-CM

## 2019-03-23 LAB
ALBUMIN SERPL BCP-MCNC: 4.6 G/DL (ref 3.2–4.9)
ALBUMIN/GLOB SERPL: 1.8 G/DL
ALP SERPL-CCNC: 59 U/L (ref 30–99)
ALT SERPL-CCNC: 37 U/L (ref 2–50)
ANION GAP SERPL CALC-SCNC: 7 MMOL/L (ref 0–11.9)
AST SERPL-CCNC: 26 U/L (ref 12–45)
BILIRUB SERPL-MCNC: 0.6 MG/DL (ref 0.1–1.5)
BUN SERPL-MCNC: 16 MG/DL (ref 8–22)
CALCIUM SERPL-MCNC: 9.8 MG/DL (ref 8.5–10.5)
CHLORIDE SERPL-SCNC: 103 MMOL/L (ref 96–112)
CHOLEST SERPL-MCNC: 206 MG/DL (ref 100–199)
CO2 SERPL-SCNC: 26 MMOL/L (ref 20–33)
CREAT SERPL-MCNC: 0.97 MG/DL (ref 0.5–1.4)
FASTING STATUS PATIENT QL REPORTED: NORMAL
GLOBULIN SER CALC-MCNC: 2.5 G/DL (ref 1.9–3.5)
GLUCOSE SERPL-MCNC: 88 MG/DL (ref 65–99)
HDLC SERPL-MCNC: 50 MG/DL
LDLC SERPL CALC-MCNC: 129 MG/DL
POTASSIUM SERPL-SCNC: 4.1 MMOL/L (ref 3.6–5.5)
PROT SERPL-MCNC: 7.1 G/DL (ref 6–8.2)
SODIUM SERPL-SCNC: 136 MMOL/L (ref 135–145)
TRIGL SERPL-MCNC: 137 MG/DL (ref 0–149)
URATE SERPL-MCNC: 7.1 MG/DL (ref 2.5–8.3)

## 2019-03-23 PROCEDURE — 80053 COMPREHEN METABOLIC PANEL: CPT

## 2019-03-23 PROCEDURE — 36415 COLL VENOUS BLD VENIPUNCTURE: CPT

## 2019-03-23 PROCEDURE — 84550 ASSAY OF BLOOD/URIC ACID: CPT

## 2019-03-23 PROCEDURE — 80061 LIPID PANEL: CPT

## 2019-07-12 ENCOUNTER — OFFICE VISIT (OUTPATIENT)
Dept: MEDICAL GROUP | Facility: PHYSICIAN GROUP | Age: 39
End: 2019-07-12
Payer: COMMERCIAL

## 2019-07-12 VITALS
RESPIRATION RATE: 12 BRPM | TEMPERATURE: 98.5 F | DIASTOLIC BLOOD PRESSURE: 76 MMHG | HEIGHT: 67 IN | SYSTOLIC BLOOD PRESSURE: 118 MMHG | OXYGEN SATURATION: 95 % | HEART RATE: 65 BPM | WEIGHT: 180 LBS | BODY MASS INDEX: 28.25 KG/M2

## 2019-07-12 DIAGNOSIS — Z13.220 SCREENING FOR LIPID DISORDERS: ICD-10-CM

## 2019-07-12 DIAGNOSIS — M54.9 UPPER BACK PAIN: ICD-10-CM

## 2019-07-12 DIAGNOSIS — Z87.39 PERSONAL HISTORY OF GOUT: ICD-10-CM

## 2019-07-12 DIAGNOSIS — R42 EPISODIC LIGHTHEADEDNESS: ICD-10-CM

## 2019-07-12 DIAGNOSIS — R00.2 INTERMITTENT PALPITATIONS: ICD-10-CM

## 2019-07-12 PROCEDURE — 99214 OFFICE O/P EST MOD 30 MIN: CPT | Performed by: FAMILY MEDICINE

## 2019-07-12 ASSESSMENT — PATIENT HEALTH QUESTIONNAIRE - PHQ9: CLINICAL INTERPRETATION OF PHQ2 SCORE: 0

## 2019-07-12 NOTE — PROGRESS NOTES
"Subjective:   Tej Hancock is a 38 y.o. male here today for palpitations and upper back pain. He is unaccompanied for today's visit.     Intermittent palpitations  Tej mentions that lately he has been experiencing intermittent heart palpitations. He states that this mostly occurs when he is sitting down and relaxing as he is able to focus on his heartbeat. Patient reports that he often feels an additional strong heartbeat and that he can sometimes hear his heartbeat as well. He does not tend to notice the palpitations when he is active or working. 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 His most recent episode of palpitations was this morning. Patient denies any chest pain, shortness of breath, or leg swelling associated with the abnormal heartbeat.     Upper back pain  The patient states that for the past few months he has been experiencing discomfort to the middle of his upper back. He denies any trauma or injury. The discomfort does not radiate. He denies any pain, but describes his discomfort as \"tightness and pulling\". Patient reports that he is able to stretch and improve his discomfort. He wonders if the discomfort may be due to the way he is sleeping at night as he tends to use multiple pillows to sleep. He does occasionally use OTC pain relievers as needed.     Episodic lightheadedness  Additionally, Tej requests to have basic screening labs ordered today. He states that lately after eating foods such as red meats he experiences some dizziness and fatigue. He is curious if this could be relate to his cholesterol, blood pressure, or blood sugar.     Personal history of gout  This is chronic and controlled with use of Allopurinol 200 mg daily. No recent gout attacks. Patient is due to have his uric acid checked.     Current medicines (including changes today)  Current Outpatient Prescriptions   Medication Sig Dispense Refill   • allopurinol (ZYLOPRIM) 100 " "MG Tab TAKE 2 TABS BY MOUTH EVERY DAY. 180 Tab 3     No current facility-administered medications for this visit.      He  has a past medical history of GOUT (12/9/2011) and Hyperlipidemia. He also has no past medical history of Hypertension.    ROS  No chest pain, no shortness of breath, no abdominal pain, no leg swelling.      Objective:     Physical Exam:  /76 (BP Location: Left arm, Patient Position: Sitting, BP Cuff Size: Adult)   Pulse 65   Temp 36.9 °C (98.5 °F) (Temporal)   Resp 12   Ht 1.702 m (5' 7\")   Wt 81.6 kg (180 lb)   SpO2 95%  Body mass index is 28.19 kg/m².  Constitutional: Alert, no distress.  Skin: Warm, dry, good turgor, no rashes in visible areas.  Eye: Equal, round and reactive, conjunctiva clear, lids normal.  ENMT: Lips without lesions, good dentition, oropharynx clear.  Neck: Trachea midline, no masses, no thyromegaly.   Respiratory: Unlabored respiratory effort, lungs clear to auscultation, no wheezes, no rhonchi.  Cardiovascular: Normal S1, S2, no murmur, no edema.  MSK: There is no abnormal spinal curvature, no gross deformities, no spinal tenderness, trapezius tenderness, or paraspinal muscle tenderness.   Psych: Alert and oriented x3, normal affect and mood.    Assessment and Plan:     1. Intermittent palpitations  This is a new problem today. Labwork and holter monitor has been ordered for further evaluation of his symptoms. We will continue to monitor. Strict return precautions given.   - CBC WITH DIFFERENTIAL; Future  - Comp Metabolic Panel; Future  - TSH WITH REFLEX TO FT4; Future  - HOLTER - Cardiology Performed (48HR); Future    2. Episodic lightheadedness  This is a new complaint today. Patient reports experiencing episodes of lightheadedness after eating specific meals. Labs as indicated.   - CBC WITH DIFFERENTIAL; Future  - Comp Metabolic Panel; Future  - TSH WITH REFLEX TO FT4; Future  - HOLTER - Cardiology Performed (48HR); Future    3. Upper back pain  This is a " new complaint today. Patient's exam is reassuring and there are no gross deformities. There is no indication for imaging at this time. Conservative measures advised. Patient is to use anitinflammatories as needed for pain. Continue to monitor.     4. Screening for lipid disorders  Follow up labs as indicated.   - Lipid Profile; Future    5. Personal history of gout  Rechecking uric acid. No recent gout attacks. Continue taking Allopurinol as prescribed.   - URIC ACID; Future    Followup: Return if symptoms worsen or fail to improve.          Benton BLAKE (Padminie), am scribing for, and in the presence of, Kira Long MD    Electronically signed by: Benton Bowles (Padminie), 7/12/2019    Kira BLAKE MD personally performed the services described in this documentation, as scribed by Benton Bowles in my presence, and it is both accurate and complete.

## 2019-07-19 ENCOUNTER — HOSPITAL ENCOUNTER (OUTPATIENT)
Dept: LAB | Facility: MEDICAL CENTER | Age: 39
End: 2019-07-19
Attending: FAMILY MEDICINE
Payer: COMMERCIAL

## 2019-07-19 DIAGNOSIS — Z13.220 SCREENING FOR LIPID DISORDERS: ICD-10-CM

## 2019-07-19 DIAGNOSIS — Z87.39 PERSONAL HISTORY OF GOUT: ICD-10-CM

## 2019-07-19 DIAGNOSIS — R00.2 INTERMITTENT PALPITATIONS: ICD-10-CM

## 2019-07-19 DIAGNOSIS — R42 EPISODIC LIGHTHEADEDNESS: ICD-10-CM

## 2019-07-19 LAB
ALBUMIN SERPL BCP-MCNC: 4.1 G/DL (ref 3.2–4.9)
ALBUMIN/GLOB SERPL: 1.6 G/DL
ALP SERPL-CCNC: 51 U/L (ref 30–99)
ALT SERPL-CCNC: 27 U/L (ref 2–50)
ANION GAP SERPL CALC-SCNC: 7 MMOL/L (ref 0–11.9)
AST SERPL-CCNC: 18 U/L (ref 12–45)
BASOPHILS # BLD AUTO: 1.1 % (ref 0–1.8)
BASOPHILS # BLD: 0.07 K/UL (ref 0–0.12)
BILIRUB SERPL-MCNC: 0.6 MG/DL (ref 0.1–1.5)
BUN SERPL-MCNC: 16 MG/DL (ref 8–22)
CALCIUM SERPL-MCNC: 9.3 MG/DL (ref 8.5–10.5)
CHLORIDE SERPL-SCNC: 107 MMOL/L (ref 96–112)
CHOLEST SERPL-MCNC: 198 MG/DL (ref 100–199)
CO2 SERPL-SCNC: 28 MMOL/L (ref 20–33)
CREAT SERPL-MCNC: 0.93 MG/DL (ref 0.5–1.4)
EOSINOPHIL # BLD AUTO: 0.2 K/UL (ref 0–0.51)
EOSINOPHIL NFR BLD: 3.1 % (ref 0–6.9)
ERYTHROCYTE [DISTWIDTH] IN BLOOD BY AUTOMATED COUNT: 44.3 FL (ref 35.9–50)
FASTING STATUS PATIENT QL REPORTED: NORMAL
GLOBULIN SER CALC-MCNC: 2.6 G/DL (ref 1.9–3.5)
GLUCOSE SERPL-MCNC: 107 MG/DL (ref 65–99)
HCT VFR BLD AUTO: 48.8 % (ref 42–52)
HDLC SERPL-MCNC: 40 MG/DL
HGB BLD-MCNC: 15.2 G/DL (ref 14–18)
IMM GRANULOCYTES # BLD AUTO: 0.01 K/UL (ref 0–0.11)
IMM GRANULOCYTES NFR BLD AUTO: 0.2 % (ref 0–0.9)
LDLC SERPL CALC-MCNC: 122 MG/DL
LYMPHOCYTES # BLD AUTO: 2.12 K/UL (ref 1–4.8)
LYMPHOCYTES NFR BLD: 33.1 % (ref 22–41)
MCH RBC QN AUTO: 29.8 PG (ref 27–33)
MCHC RBC AUTO-ENTMCNC: 31.1 G/DL (ref 33.7–35.3)
MCV RBC AUTO: 95.7 FL (ref 81.4–97.8)
MONOCYTES # BLD AUTO: 0.45 K/UL (ref 0–0.85)
MONOCYTES NFR BLD AUTO: 7 % (ref 0–13.4)
NEUTROPHILS # BLD AUTO: 3.55 K/UL (ref 1.82–7.42)
NEUTROPHILS NFR BLD: 55.5 % (ref 44–72)
NRBC # BLD AUTO: 0 K/UL
NRBC BLD-RTO: 0 /100 WBC
PLATELET # BLD AUTO: 240 K/UL (ref 164–446)
PMV BLD AUTO: 12.4 FL (ref 9–12.9)
POTASSIUM SERPL-SCNC: 4.2 MMOL/L (ref 3.6–5.5)
PROT SERPL-MCNC: 6.7 G/DL (ref 6–8.2)
RBC # BLD AUTO: 5.1 M/UL (ref 4.7–6.1)
SODIUM SERPL-SCNC: 142 MMOL/L (ref 135–145)
TRIGL SERPL-MCNC: 182 MG/DL (ref 0–149)
TSH SERPL DL<=0.005 MIU/L-ACNC: 2.67 UIU/ML (ref 0.38–5.33)
URATE SERPL-MCNC: 7.1 MG/DL (ref 2.5–8.3)
WBC # BLD AUTO: 6.4 K/UL (ref 4.8–10.8)

## 2019-07-19 PROCEDURE — 84550 ASSAY OF BLOOD/URIC ACID: CPT

## 2019-07-19 PROCEDURE — 85025 COMPLETE CBC W/AUTO DIFF WBC: CPT

## 2019-07-19 PROCEDURE — 84443 ASSAY THYROID STIM HORMONE: CPT

## 2019-07-19 PROCEDURE — 80061 LIPID PANEL: CPT

## 2019-07-19 PROCEDURE — 80053 COMPREHEN METABOLIC PANEL: CPT

## 2019-07-19 PROCEDURE — 36415 COLL VENOUS BLD VENIPUNCTURE: CPT

## 2019-09-06 DIAGNOSIS — M10.9 GOUT OF FOOT, UNSPECIFIED CAUSE, UNSPECIFIED CHRONICITY, UNSPECIFIED LATERALITY: ICD-10-CM

## 2019-09-06 RX ORDER — ALLOPURINOL 100 MG/1
TABLET ORAL
Qty: 180 TAB | Refills: 3 | Status: SHIPPED | OUTPATIENT
Start: 2019-09-06 | End: 2020-09-03

## 2020-08-21 ENCOUNTER — APPOINTMENT (OUTPATIENT)
Dept: MEDICAL GROUP | Facility: PHYSICIAN GROUP | Age: 40
End: 2020-08-21

## 2020-09-03 DIAGNOSIS — M10.9 GOUT OF FOOT, UNSPECIFIED CAUSE, UNSPECIFIED CHRONICITY, UNSPECIFIED LATERALITY: ICD-10-CM

## 2020-09-03 RX ORDER — ALLOPURINOL 100 MG/1
TABLET ORAL
Qty: 180 TAB | Refills: 3 | Status: SHIPPED | OUTPATIENT
Start: 2020-09-03 | End: 2020-10-05

## 2020-09-18 ENCOUNTER — OFFICE VISIT (OUTPATIENT)
Dept: MEDICAL GROUP | Facility: PHYSICIAN GROUP | Age: 40
End: 2020-09-18
Payer: COMMERCIAL

## 2020-09-18 VITALS
HEART RATE: 69 BPM | OXYGEN SATURATION: 98 % | WEIGHT: 177 LBS | DIASTOLIC BLOOD PRESSURE: 78 MMHG | RESPIRATION RATE: 16 BRPM | TEMPERATURE: 98.3 F | HEIGHT: 67 IN | SYSTOLIC BLOOD PRESSURE: 110 MMHG | BODY MASS INDEX: 27.78 KG/M2

## 2020-09-18 DIAGNOSIS — E78.00 PURE HYPERCHOLESTEROLEMIA: ICD-10-CM

## 2020-09-18 DIAGNOSIS — Z87.39 PERSONAL HISTORY OF GOUT: ICD-10-CM

## 2020-09-18 DIAGNOSIS — Z00.00 ENCOUNTER FOR PREVENTATIVE ADULT HEALTH CARE EXAMINATION: ICD-10-CM

## 2020-09-18 PROBLEM — M72.2 PLANTAR FASCIITIS, BILATERAL: Status: RESOLVED | Noted: 2017-07-07 | Resolved: 2020-09-18

## 2020-09-18 PROBLEM — R00.2 INTERMITTENT PALPITATIONS: Status: RESOLVED | Noted: 2019-07-12 | Resolved: 2020-09-18

## 2020-09-18 PROCEDURE — 99396 PREV VISIT EST AGE 40-64: CPT | Performed by: FAMILY MEDICINE

## 2020-09-18 ASSESSMENT — FIBROSIS 4 INDEX: FIB4 SCORE: 0.61

## 2020-09-18 ASSESSMENT — PATIENT HEALTH QUESTIONNAIRE - PHQ9: CLINICAL INTERPRETATION OF PHQ2 SCORE: 0

## 2020-09-18 NOTE — PROGRESS NOTES
Subjective:     CC:   Chief Complaint   Patient presents with   • Annual Exam     preventative care exam     HPI:   Tej Hancock is a 40 y.o. male who presents for annual exam.  It [birth year] today!    Last Colorectal Cancer Screening: N/A  Last Tdap:   Received HPV series: No  Hx STDs: No    Exercise: moderate regular exercise, aerobic < 3 days a week  Diet: healthy      He  has a past medical history of GOUT (2011) and Hyperlipidemia. He also has no past medical history of Hypertension.  He  has a past surgical history that includes open reduction and tympanoplasty (2012).    Family History   Problem Relation Age of Onset   • Stroke Maternal Grandfather    • Other Brother    • Other Brother    • Cancer Neg Hx    • Diabetes Neg Hx      Social History     Tobacco Use   • Smoking status: Former Smoker     Packs/day: 0.50     Years: 20.00     Pack years: 10.00     Types: Cigarettes     Quit date: 10/22/2013     Years since quittin.9   • Smokeless tobacco: Never Used   • Tobacco comment: 1 pack every 2 days   Substance Use Topics   • Alcohol use: No     Alcohol/week: 0.0 oz   • Drug use: No     He  reports being sexually active and has had partner(s) who are Female. He reports using the following methods of birth control/protection: Condom and Pill.    Patient Active Problem List    Diagnosis Date Noted   • Pure hypercholesterolemia 10/22/2015   • Personal history of gout 2011     Current Outpatient Medications   Medication Sig Dispense Refill   • allopurinol (ZYLOPRIM) 100 MG Tab TAKE 2 TABLETS BY MOUTH EVERY  Tab 3     No current facility-administered medications for this visit.      No Known Allergies    Review of Systems   Constitutional: Negative for fever, chills and malaise/fatigue.   HENT: Negative for congestion.    Eyes: Negative for pain.   Respiratory: Negative for cough and shortness of breath.    Cardiovascular: Negative for chest pain and leg swelling.  "  Gastrointestinal: Negative for nausea, vomiting, abdominal pain and diarrhea.   Genitourinary: Negative for dysuria and hematuria.   Skin: Negative for rash.   Neurological: Negative for dizziness, focal weakness and headaches.   Endo/Heme/Allergies: Does not bruise/bleed easily.   Psychiatric/Behavioral: Negative for depression.  The patient is not nervous/anxious.    He does mention that he has had some intermittent left shoulder pain.    Objective:   /78 (BP Location: Left arm, Patient Position: Sitting, BP Cuff Size: Adult)   Pulse 69   Temp 36.8 °C (98.3 °F)   Resp 16   Ht 1.689 m (5' 6.5\")   Wt 80.3 kg (177 lb)   SpO2 98%   BMI 28.14 kg/m²      Wt Readings from Last 4 Encounters:   09/18/20 80.3 kg (177 lb)   07/12/19 81.6 kg (180 lb)   07/25/18 83.9 kg (185 lb)   12/27/17 84.8 kg (187 lb)     Physical Exam:  Constitutional: Well-developed and well-nourished. Not diaphoretic. No distress.   Skin: Skin is warm and dry. No rash noted.  Head: Atraumatic without lesions.  Eyes: Conjunctivae and extraocular motions are normal. Pupils are equal, round, and reactive to light. No scleral icterus.   Ears:  External ears unremarkable. Tympanic membranes clear and intact.  Nose: Nares patent. Septum midline. Turbinates without erythema nor edema. No discharge.   Mouth/Throat: Tongue normal. Oropharynx is clear and moist. Posterior pharynx without erythema or exudates.  Neck: Supple, trachea midline. Normal range of motion. No thyromegaly present. No lymphadenopathy--cervical or supraclavicular.  Cardiovascular: Regular rate and rhythm, S1 and S2 without murmur, rubs, or gallops.    Abdomen: Soft, non tender, and without distention. Active bowel sounds in all four quadrants. No rebound, guarding, masses or HSM.  Extremities: No cyanosis, clubbing, erythema, nor edema. Distal pulses intact and symmetric.   Musculoskeletal: All major joints AROM full in all directions without pain. Left shoulder without gross " deformities, edema or erythema.  Full ROM.  No tenderness to palpation. Good strength.  Neurological: Alert and oriented x 3. Grossly non-focal. Strength and sensation grossly intact.   Psychiatric:  Behavior, mood, and affect are appropriate.    Assessment and Plan:     1. Encounter for preventative adult health care examination  This is a healthy 40-year-old male here today for a preventative exam.  Previous medical history, healthcare maintenance and immunization status reviewed.  Patient is up to date.  Annual labwork ordered.  See discussion of anticipatory guidance below.  Patient will return annually for preventative exams.  - Comp Metabolic Panel; Future  - Lipid Profile; Future    2. Personal history of gout  Chronic and stable.  He does mention having a possible mild flare last week that resolved on it's own.  Will check uric acid level.  Continue allopurinol.  - URIC ACID; Future    3. Pure hypercholesterolemia  Recheck levels.  He has been managing this on his own with diet and exercise.    Health maintenance:  UTD   Labs per orders  Will return next week for flu shot  Patient counseled about skin care, diet, supplements, and exercise.  Discussed diet and exercise     Follow-up: No follow-ups on file.

## 2020-09-25 ENCOUNTER — APPOINTMENT (OUTPATIENT)
Dept: MEDICAL GROUP | Facility: PHYSICIAN GROUP | Age: 40
End: 2020-09-25
Payer: COMMERCIAL

## 2020-10-04 ENCOUNTER — PATIENT MESSAGE (OUTPATIENT)
Dept: MEDICAL GROUP | Facility: PHYSICIAN GROUP | Age: 40
End: 2020-10-04

## 2020-10-04 DIAGNOSIS — Z87.39 PERSONAL HISTORY OF GOUT: ICD-10-CM

## 2020-10-05 RX ORDER — ALLOPURINOL 300 MG/1
300 TABLET ORAL DAILY
Qty: 90 TAB | Refills: 3 | Status: SHIPPED | OUTPATIENT
Start: 2020-10-05 | End: 2021-03-19 | Stop reason: SDUPTHER

## 2020-10-05 NOTE — PROGRESS NOTES
Can we call the pharmacy to see if they have the allopurinol 100mg tablets in stock?  Patient is currently on 200mg.  -Kira Long M.D.

## 2020-10-12 ENCOUNTER — APPOINTMENT (OUTPATIENT)
Dept: MEDICAL GROUP | Facility: PHYSICIAN GROUP | Age: 40
End: 2020-10-12
Payer: COMMERCIAL

## 2020-10-16 ENCOUNTER — NON-PROVIDER VISIT (OUTPATIENT)
Dept: MEDICAL GROUP | Facility: PHYSICIAN GROUP | Age: 40
End: 2020-10-16
Payer: COMMERCIAL

## 2020-10-16 DIAGNOSIS — Z23 NEED FOR IMMUNIZATION AGAINST INFLUENZA: ICD-10-CM

## 2020-10-16 PROCEDURE — 90686 IIV4 VACC NO PRSV 0.5 ML IM: CPT | Performed by: FAMILY MEDICINE

## 2020-10-16 PROCEDURE — 90471 IMMUNIZATION ADMIN: CPT | Performed by: FAMILY MEDICINE

## 2020-10-16 NOTE — PROGRESS NOTES
"Mike Hancock is a 40 y.o. male here for a non-provider visit for:   FLU    Reason for immunization: Annual Flu Vaccine  Immunization records indicate need for vaccine: Yes, confirmed with Epic  Minimum interval has been met for this vaccine: Yes  ABN completed: Yes    Order and dose verified by: Amilcar Benitez  VIS Dated  8/15/2019 was given to patient: Yes  All IAC Questionnaire questions were answered \"No.\"    Patient tolerated injection and no adverse effects were observed or reported: Yes    Pt scheduled for next dose in series: Not Indicated    "

## 2020-10-17 ENCOUNTER — HOSPITAL ENCOUNTER (OUTPATIENT)
Dept: LAB | Facility: MEDICAL CENTER | Age: 40
End: 2020-10-17
Attending: FAMILY MEDICINE
Payer: COMMERCIAL

## 2020-10-17 DIAGNOSIS — Z00.00 ENCOUNTER FOR PREVENTATIVE ADULT HEALTH CARE EXAMINATION: ICD-10-CM

## 2020-10-17 DIAGNOSIS — Z87.39 PERSONAL HISTORY OF GOUT: ICD-10-CM

## 2020-10-17 LAB
ALBUMIN SERPL BCP-MCNC: 4.6 G/DL (ref 3.2–4.9)
ALBUMIN/GLOB SERPL: 1.6 G/DL
ALP SERPL-CCNC: 65 U/L (ref 30–99)
ALT SERPL-CCNC: 30 U/L (ref 2–50)
ANION GAP SERPL CALC-SCNC: 11 MMOL/L (ref 7–16)
AST SERPL-CCNC: 25 U/L (ref 12–45)
BILIRUB SERPL-MCNC: 0.7 MG/DL (ref 0.1–1.5)
BUN SERPL-MCNC: 15 MG/DL (ref 8–22)
CALCIUM SERPL-MCNC: 9.4 MG/DL (ref 8.5–10.5)
CHLORIDE SERPL-SCNC: 103 MMOL/L (ref 96–112)
CHOLEST SERPL-MCNC: 213 MG/DL (ref 100–199)
CO2 SERPL-SCNC: 26 MMOL/L (ref 20–33)
CREAT SERPL-MCNC: 1.03 MG/DL (ref 0.5–1.4)
GLOBULIN SER CALC-MCNC: 2.8 G/DL (ref 1.9–3.5)
GLUCOSE SERPL-MCNC: 91 MG/DL (ref 65–99)
HDLC SERPL-MCNC: 46 MG/DL
LDLC SERPL CALC-MCNC: 132 MG/DL
POTASSIUM SERPL-SCNC: 4 MMOL/L (ref 3.6–5.5)
PROT SERPL-MCNC: 7.4 G/DL (ref 6–8.2)
SODIUM SERPL-SCNC: 140 MMOL/L (ref 135–145)
TRIGL SERPL-MCNC: 176 MG/DL (ref 0–149)
URATE SERPL-MCNC: 6.8 MG/DL (ref 2.5–8.3)

## 2020-10-17 PROCEDURE — 80061 LIPID PANEL: CPT

## 2020-10-17 PROCEDURE — 84550 ASSAY OF BLOOD/URIC ACID: CPT

## 2020-10-17 PROCEDURE — 80053 COMPREHEN METABOLIC PANEL: CPT

## 2020-10-17 PROCEDURE — 36415 COLL VENOUS BLD VENIPUNCTURE: CPT

## 2021-03-19 ENCOUNTER — PATIENT MESSAGE (OUTPATIENT)
Dept: MEDICAL GROUP | Facility: PHYSICIAN GROUP | Age: 41
End: 2021-03-19

## 2021-03-19 DIAGNOSIS — Z87.39 PERSONAL HISTORY OF GOUT: ICD-10-CM

## 2021-03-19 RX ORDER — ALLOPURINOL 300 MG/1
300 TABLET ORAL DAILY
Qty: 90 TABLET | Refills: 1 | Status: SHIPPED
Start: 2021-03-19 | End: 2021-06-01 | Stop reason: SDUPTHER

## 2021-05-14 ENCOUNTER — OFFICE VISIT (OUTPATIENT)
Dept: MEDICAL GROUP | Facility: PHYSICIAN GROUP | Age: 41
End: 2021-05-14
Payer: COMMERCIAL

## 2021-05-14 VITALS
OXYGEN SATURATION: 96 % | BODY MASS INDEX: 29.25 KG/M2 | DIASTOLIC BLOOD PRESSURE: 79 MMHG | RESPIRATION RATE: 15 BRPM | WEIGHT: 182 LBS | TEMPERATURE: 98.7 F | SYSTOLIC BLOOD PRESSURE: 120 MMHG | HEIGHT: 66 IN | HEART RATE: 86 BPM

## 2021-05-14 DIAGNOSIS — M1A.00X0 IDIOPATHIC CHRONIC GOUT WITHOUT TOPHUS, UNSPECIFIED SITE: ICD-10-CM

## 2021-05-14 DIAGNOSIS — E78.00 PURE HYPERCHOLESTEROLEMIA: ICD-10-CM

## 2021-05-14 PROCEDURE — 99214 OFFICE O/P EST MOD 30 MIN: CPT | Performed by: FAMILY MEDICINE

## 2021-05-14 ASSESSMENT — FIBROSIS 4 INDEX: FIB4 SCORE: 0.8

## 2021-05-14 ASSESSMENT — PATIENT HEALTH QUESTIONNAIRE - PHQ9: CLINICAL INTERPRETATION OF PHQ2 SCORE: 0

## 2021-05-14 NOTE — PROGRESS NOTES
"Subjective:     Chief Complaint   Patient presents with   • Establish Care   • Requesting Labs       HPI:   Tej presents today to discuss the following.    Pure hypercholesterolemia  Chronic condition.  Last checked October 2020 with mildly elevated values.  The 10-year ASCVD risk score (Niranjan DESMOND Jr., et al., 2013) is: 1.4%      Chronic gout without tophus  Chronic issue.  On allopurinol daily.  Uric acid level is stable.      Past Medical History:   Diagnosis Date   • GOUT 12/9/2011   • Hyperlipidemia        Current Outpatient Medications Ordered in Epic   Medication Sig Dispense Refill   • allopurinol (ZYLOPRIM) 300 MG Tab Take 1 tablet by mouth every day. 90 tablet 1     No current Epic-ordered facility-administered medications on file.       Allergies:  Patient has no known allergies.    Health Maintenance: Completed    ROS:  Gen: no fevers/chills, no changes in weight  Eyes: no changes in vision  Pulm: no sob, no cough  CV: no chest pain, no palpitations  GI: no nausea/vomiting, no diarrhea      Objective:     Exam:  /79 (BP Location: Left arm, Patient Position: Sitting, BP Cuff Size: Adult)   Pulse 86   Temp 37.1 °C (98.7 °F) (Temporal)   Resp 15   Ht 1.676 m (5' 6\")   Wt 82.6 kg (182 lb)   SpO2 96%   BMI 29.38 kg/m²  Body mass index is 29.38 kg/m².        Constitutional: Alert, no distress, well-groomed.  Skin: Warm, dry, good turgor, no rashes in visible areas.  Eye: Equal, round and reactive, conjunctiva clear, lids normal.  ENMT: Lips without lesions, good dentition, moist mucous membranes.  Neck: Trachea midline, no masses, no thyromegaly.  Respiratory: Unlabored respiratory effort, no cough.  MSK: Normal gait, moves all extremities.  Neuro: Grossly non-focal.   Psych: Alert and oriented x3, normal affect and mood.        Assessment & Plan:     40 y.o. male with the following -     1. Pure hypercholesterolemia  Chronic, stable condition.  I recommend lifestyle change today.  No cholesterol " medicine indicated at this time.  Due for repeat labs November 2021.  - CBC WITHOUT DIFFERENTIAL; Future  - Comp Metabolic Panel; Future  - Lipid Profile; Future  - URIC ACID; Future    2. Idiopathic chronic gout without tophus, unspecified site  Chronic, stable condition.  Continue with allopurinol.  - CBC WITHOUT DIFFERENTIAL; Future  - Comp Metabolic Panel; Future  - Lipid Profile; Future  - URIC ACID; Future      Return in about 6 months (around 11/14/2021).    Please note that this dictation was created using voice recognition software. I have made every reasonable attempt to correct obvious errors, but I expect that there are errors of grammar and possibly content that I did not discover before finalizing the note.

## 2021-05-14 NOTE — ASSESSMENT & PLAN NOTE
Chronic condition.  Last checked October 2020 with mildly elevated values.  The 10-year ASCVD risk score (Pemberton DESMOND Jr., et al., 2013) is: 1.4%

## 2021-06-01 DIAGNOSIS — Z87.39 PERSONAL HISTORY OF GOUT: ICD-10-CM

## 2021-06-01 RX ORDER — ALLOPURINOL 300 MG/1
300 TABLET ORAL DAILY
Qty: 90 TABLET | Refills: 3 | Status: SHIPPED | OUTPATIENT
Start: 2021-06-01 | End: 2022-06-06

## 2021-09-24 ENCOUNTER — PATIENT MESSAGE (OUTPATIENT)
Dept: MEDICAL GROUP | Facility: PHYSICIAN GROUP | Age: 41
End: 2021-09-24

## 2021-10-18 RX ORDER — DOXYCYCLINE HYCLATE 100 MG
100 TABLET ORAL 2 TIMES DAILY
Qty: 90 TABLET | Refills: 0 | Status: SHIPPED | OUTPATIENT
Start: 2021-10-18 | End: 2021-11-15

## 2021-11-12 ENCOUNTER — HOSPITAL ENCOUNTER (OUTPATIENT)
Dept: LAB | Facility: MEDICAL CENTER | Age: 41
End: 2021-11-12
Attending: FAMILY MEDICINE
Payer: COMMERCIAL

## 2021-11-12 DIAGNOSIS — M1A.00X0 IDIOPATHIC CHRONIC GOUT WITHOUT TOPHUS, UNSPECIFIED SITE: ICD-10-CM

## 2021-11-12 DIAGNOSIS — E78.00 PURE HYPERCHOLESTEROLEMIA: ICD-10-CM

## 2021-11-12 LAB
ALBUMIN SERPL BCP-MCNC: 4.7 G/DL (ref 3.2–4.9)
ALBUMIN/GLOB SERPL: 1.7 G/DL
ALP SERPL-CCNC: 60 U/L (ref 30–99)
ALT SERPL-CCNC: 29 U/L (ref 2–50)
ANION GAP SERPL CALC-SCNC: 11 MMOL/L (ref 7–16)
AST SERPL-CCNC: 22 U/L (ref 12–45)
BILIRUB SERPL-MCNC: 0.4 MG/DL (ref 0.1–1.5)
BUN SERPL-MCNC: 14 MG/DL (ref 8–22)
CALCIUM SERPL-MCNC: 9.4 MG/DL (ref 8.5–10.5)
CHLORIDE SERPL-SCNC: 103 MMOL/L (ref 96–112)
CHOLEST SERPL-MCNC: 222 MG/DL (ref 100–199)
CO2 SERPL-SCNC: 23 MMOL/L (ref 20–33)
CREAT SERPL-MCNC: 0.86 MG/DL (ref 0.5–1.4)
ERYTHROCYTE [DISTWIDTH] IN BLOOD BY AUTOMATED COUNT: 43 FL (ref 35.9–50)
GLOBULIN SER CALC-MCNC: 2.8 G/DL (ref 1.9–3.5)
GLUCOSE SERPL-MCNC: 103 MG/DL (ref 65–99)
HCT VFR BLD AUTO: 49.6 % (ref 42–52)
HDLC SERPL-MCNC: 46 MG/DL
HGB BLD-MCNC: 16.4 G/DL (ref 14–18)
LDLC SERPL CALC-MCNC: 147 MG/DL
MCH RBC QN AUTO: 30.7 PG (ref 27–33)
MCHC RBC AUTO-ENTMCNC: 33.1 G/DL (ref 33.7–35.3)
MCV RBC AUTO: 92.9 FL (ref 81.4–97.8)
PLATELET # BLD AUTO: 240 K/UL (ref 164–446)
PMV BLD AUTO: 11.2 FL (ref 9–12.9)
POTASSIUM SERPL-SCNC: 4.7 MMOL/L (ref 3.6–5.5)
PROT SERPL-MCNC: 7.5 G/DL (ref 6–8.2)
RBC # BLD AUTO: 5.34 M/UL (ref 4.7–6.1)
SODIUM SERPL-SCNC: 137 MMOL/L (ref 135–145)
TRIGL SERPL-MCNC: 144 MG/DL (ref 0–149)
URATE SERPL-MCNC: 6.5 MG/DL (ref 2.5–8.3)
WBC # BLD AUTO: 7.4 K/UL (ref 4.8–10.8)

## 2021-11-12 PROCEDURE — 84550 ASSAY OF BLOOD/URIC ACID: CPT

## 2021-11-12 PROCEDURE — 80061 LIPID PANEL: CPT

## 2021-11-12 PROCEDURE — 85027 COMPLETE CBC AUTOMATED: CPT

## 2021-11-12 PROCEDURE — 36415 COLL VENOUS BLD VENIPUNCTURE: CPT

## 2021-11-12 PROCEDURE — 80053 COMPREHEN METABOLIC PANEL: CPT

## 2021-11-15 RX ORDER — DOXYCYCLINE HYCLATE 100 MG
TABLET ORAL
Qty: 60 TABLET | Refills: 0 | Status: SHIPPED | OUTPATIENT
Start: 2021-11-15 | End: 2022-02-04

## 2021-11-19 ENCOUNTER — OFFICE VISIT (OUTPATIENT)
Dept: MEDICAL GROUP | Facility: PHYSICIAN GROUP | Age: 41
End: 2021-11-19
Payer: COMMERCIAL

## 2021-11-19 VITALS
OXYGEN SATURATION: 94 % | HEIGHT: 66 IN | TEMPERATURE: 97.8 F | SYSTOLIC BLOOD PRESSURE: 120 MMHG | WEIGHT: 184 LBS | DIASTOLIC BLOOD PRESSURE: 78 MMHG | BODY MASS INDEX: 29.57 KG/M2 | HEART RATE: 85 BPM

## 2021-11-19 DIAGNOSIS — E78.00 PURE HYPERCHOLESTEROLEMIA: ICD-10-CM

## 2021-11-19 DIAGNOSIS — M1A.00X0 IDIOPATHIC CHRONIC GOUT WITHOUT TOPHUS, UNSPECIFIED SITE: ICD-10-CM

## 2021-11-19 DIAGNOSIS — E66.3 OVERWEIGHT: ICD-10-CM

## 2021-11-19 PROCEDURE — 99214 OFFICE O/P EST MOD 30 MIN: CPT | Performed by: FAMILY MEDICINE

## 2021-11-19 ASSESSMENT — FIBROSIS 4 INDEX: FIB4 SCORE: 0.73

## 2021-11-20 NOTE — PROGRESS NOTES
"Subjective:     Chief Complaint   Patient presents with   • Results       HPI:   Tej presents today to discuss the following.    Pure hypercholesterolemia  Chronic condition.  Last checked this month with mild elevation in values.  The 10-year ASCVD risk score (Douglas DC Jr., et al., 2013) is: 1.9%    Not on statin therapy.    Chronic gout without tophus  Chronic issue.  On allopurinol daily.  Most recent uric acid level is normal.    Overweight  Chronic issue  BMI at 29 and stable  Has not been exercising but keeps active at work      Past Medical History:   Diagnosis Date   • GOUT 12/9/2011   • Hyperlipidemia        Current Outpatient Medications Ordered in Epic   Medication Sig Dispense Refill   • allopurinol (ZYLOPRIM) 300 MG Tab Take 1 tablet by mouth every day. 90 tablet 3   • doxycycline (VIBRAMYCIN) 100 MG Tab TAKE 1 TABLET BY MOUTH TWICE A DAY 60 Tablet 0     No current Epic-ordered facility-administered medications on file.       Allergies:  Patient has no known allergies.    Health Maintenance: Completed    ROS:  Gen: no fevers/chills, no changes in weight  Eyes: no changes in vision  Pulm: no sob, no cough  CV: no chest pain, no palpitations  GI: no nausea/vomiting, no diarrhea      Objective:     Exam:  /78 (BP Location: Right arm, Patient Position: Sitting, BP Cuff Size: Adult)   Pulse 85   Temp 36.6 °C (97.8 °F) (Temporal)   Ht 1.676 m (5' 6\")   Wt 83.5 kg (184 lb)   SpO2 94%   BMI 29.70 kg/m²  Body mass index is 29.7 kg/m².        Constitutional: Alert, no distress, well-groomed.  Skin: Warm, dry, good turgor, no rashes in visible areas.  Eye: Equal, round and reactive, conjunctiva clear, lids normal.  ENMT: Lips without lesions, good dentition, moist mucous membranes.  Neck: Trachea midline, no masses, no thyromegaly.  Respiratory: Unlabored respiratory effort, no cough.  MSK: Normal gait, moves all extremities.  Neuro: Grossly non-focal.   Psych: Alert and oriented x3, normal affect " and mood.        Assessment & Plan:     41 y.o. male with the following -     1. Pure hypercholesterolemia  Chronic, stable condition.  No need for statin therapy at this time.  Continue with lifestyle change.    2. Idiopathic chronic gout without tophus, unspecified site  Chronic, stable condition.  Continue with allopurinol.  Uric acid level reviewed and normal.    3. Overweight  Chronic, stable condition.  Continue to reinforce lifestyle change.      Return in about 6 months (around 5/19/2022).    Please note that this dictation was created using voice recognition software. I have made every reasonable attempt to correct obvious errors, but I expect that there are errors of grammar and possibly content that I did not discover before finalizing the note.

## 2022-02-04 ENCOUNTER — OFFICE VISIT (OUTPATIENT)
Dept: MEDICAL GROUP | Facility: PHYSICIAN GROUP | Age: 42
End: 2022-02-04
Payer: COMMERCIAL

## 2022-02-04 VITALS
DIASTOLIC BLOOD PRESSURE: 74 MMHG | SYSTOLIC BLOOD PRESSURE: 116 MMHG | TEMPERATURE: 98.6 F | BODY MASS INDEX: 28.83 KG/M2 | OXYGEN SATURATION: 96 % | HEART RATE: 83 BPM | WEIGHT: 179.4 LBS | HEIGHT: 66 IN

## 2022-02-04 DIAGNOSIS — R42 LIGHTHEADED: ICD-10-CM

## 2022-02-04 DIAGNOSIS — L20.84 INTRINSIC ECZEMA: ICD-10-CM

## 2022-02-04 PROCEDURE — 99214 OFFICE O/P EST MOD 30 MIN: CPT | Performed by: FAMILY MEDICINE

## 2022-02-04 RX ORDER — TRIAMCINOLONE ACETONIDE 1 MG/G
CREAM TOPICAL
Qty: 80 G | Refills: 0 | Status: SHIPPED | OUTPATIENT
Start: 2022-02-04 | End: 2023-11-03

## 2022-02-04 ASSESSMENT — PATIENT HEALTH QUESTIONNAIRE - PHQ9: CLINICAL INTERPRETATION OF PHQ2 SCORE: 0

## 2022-02-04 ASSESSMENT — FIBROSIS 4 INDEX: FIB4 SCORE: 0.73

## 2022-02-05 ENCOUNTER — HOSPITAL ENCOUNTER (OUTPATIENT)
Dept: LAB | Facility: MEDICAL CENTER | Age: 42
End: 2022-02-05
Attending: FAMILY MEDICINE
Payer: COMMERCIAL

## 2022-02-05 DIAGNOSIS — R42 LIGHTHEADED: ICD-10-CM

## 2022-02-05 LAB
ERYTHROCYTE [DISTWIDTH] IN BLOOD BY AUTOMATED COUNT: 41.4 FL (ref 35.9–50)
EST. AVERAGE GLUCOSE BLD GHB EST-MCNC: 111 MG/DL
HBA1C MFR BLD: 5.5 % (ref 4–5.6)
HCT VFR BLD AUTO: 48.7 % (ref 42–52)
HGB BLD-MCNC: 16.6 G/DL (ref 14–18)
MCH RBC QN AUTO: 31.2 PG (ref 27–33)
MCHC RBC AUTO-ENTMCNC: 34.1 G/DL (ref 33.7–35.3)
MCV RBC AUTO: 91.5 FL (ref 81.4–97.8)
PLATELET # BLD AUTO: 250 K/UL (ref 164–446)
PMV BLD AUTO: 11.3 FL (ref 9–12.9)
RBC # BLD AUTO: 5.32 M/UL (ref 4.7–6.1)
TSH SERPL DL<=0.005 MIU/L-ACNC: 2.38 UIU/ML (ref 0.38–5.33)
WBC # BLD AUTO: 6.7 K/UL (ref 4.8–10.8)

## 2022-02-05 PROCEDURE — 83036 HEMOGLOBIN GLYCOSYLATED A1C: CPT

## 2022-02-05 PROCEDURE — 84443 ASSAY THYROID STIM HORMONE: CPT

## 2022-02-05 PROCEDURE — 36415 COLL VENOUS BLD VENIPUNCTURE: CPT

## 2022-02-05 PROCEDURE — 85027 COMPLETE CBC AUTOMATED: CPT

## 2022-02-05 NOTE — PROGRESS NOTES
"Subjective:     Chief Complaint   Patient presents with   • Lightheadedness     Headache,woresens after meals, x2 weeks, slightly better,   • Rash     LLQ Stomach, x2-3weeks, recently worsening,       HPI:   Tej presents today to discuss the following.    Lightheaded  New issue  Started feeling lightheaded 2 weeks ago  Denies the room spinning   He feels like he has head pressure and feels nauseous   Denies any visions   He does not suffer from headache  Denies waking up from the headache   Has not taken any medicine for this     Intrinsic eczema  New issue  New rash to left abdomen area a couple of weeks ago  Does not itch      Past Medical History:   Diagnosis Date   • GOUT 12/9/2011   • Hyperlipidemia        Current Outpatient Medications Ordered in Epic   Medication Sig Dispense Refill   • triamcinolone acetonide (KENALOG) 0.1 % Cream Apply twice daily to affected area 80 g 0   • allopurinol (ZYLOPRIM) 300 MG Tab Take 1 tablet by mouth every day. 90 tablet 3     No current Epic-ordered facility-administered medications on file.       Allergies:  Patient has no known allergies.    Health Maintenance: Completed    ROS:  Gen: no fevers/chills, no changes in weight  Eyes: no changes in vision  Pulm: no sob, no cough  CV: no chest pain, no palpitations  GI: no nausea/vomiting, no diarrhea      Objective:     Exam:  /74 (BP Location: Left arm, Patient Position: Sitting, BP Cuff Size: Adult)   Pulse 83   Temp 37 °C (98.6 °F) (Temporal)   Ht 1.676 m (5' 6\")   Wt 81.4 kg (179 lb 6.4 oz)   SpO2 96%   BMI 28.96 kg/m²  Body mass index is 28.96 kg/m².      Constitutional: Alert, no distress, well-groomed.  Skin: Warm, dry, good turgor, no rashes in visible areas.  Eye: Equal, round and reactive, conjunctiva clear, lids normal.  ENMT: Lips without lesions, good dentition, moist mucous membranes.  Neck: Trachea midline, no masses, no thyromegaly.  Respiratory: Unlabored respiratory effort, no cough.  MSK: " Normal gait, moves all extremities.  Neuro: Grossly non-focal.   Psych: Alert and oriented x3, normal affect and mood.        Assessment & Plan:     41 y.o. male with the following -     1. Lightheaded  New problem.  Unknown etiology and prognosis.  The patient endorses lightheadedness along with throbbing headache sensation with some off-and-on nausea.  His symptoms are nonspecific and atypical.  I did offer him a brain MRI but he would like to hold off on this.  He is amenable to doing blood work to rule out secondary causes such as diabetes or anemia.  He will continue to identify potential triggers.  Return precautions recommended.  I offered a trial of sumatriptan but he would like to try Tylenol first.  - HEMOGLOBIN A1C; Future  - CBC WITHOUT DIFFERENTIAL; Future  - TSH WITH REFLEX TO FT4; Future    2. Intrinsic eczema  New problem.  Likely eczema localized to the abdominal area.  Will prescribe Kenalog cream.  - triamcinolone acetonide (KENALOG) 0.1 % Cream; Apply twice daily to affected area  Dispense: 80 g; Refill: 0      Return in about 6 weeks (around 3/18/2022).    Please note that this dictation was created using voice recognition software. I have made every reasonable attempt to correct obvious errors, but I expect that there are errors of grammar and possibly content that I did not discover before finalizing the note.

## 2022-02-07 RX ORDER — SUMATRIPTAN 100 MG/1
100 TABLET, FILM COATED ORAL
Qty: 10 TABLET | Refills: 3 | Status: SHIPPED | OUTPATIENT
Start: 2022-02-07 | End: 2022-06-24

## 2022-06-06 DIAGNOSIS — Z87.39 PERSONAL HISTORY OF GOUT: ICD-10-CM

## 2022-06-06 RX ORDER — ALLOPURINOL 300 MG/1
300 TABLET ORAL DAILY
Qty: 90 TABLET | Refills: 3 | Status: SHIPPED | OUTPATIENT
Start: 2022-06-06 | End: 2023-11-03 | Stop reason: SDUPTHER

## 2022-06-11 ENCOUNTER — OFFICE VISIT (OUTPATIENT)
Dept: URGENT CARE | Facility: CLINIC | Age: 42
End: 2022-06-11
Payer: COMMERCIAL

## 2022-06-11 VITALS
TEMPERATURE: 101.1 F | RESPIRATION RATE: 18 BRPM | BODY MASS INDEX: 29.51 KG/M2 | OXYGEN SATURATION: 96 % | HEIGHT: 66 IN | HEART RATE: 122 BPM | WEIGHT: 183.6 LBS | SYSTOLIC BLOOD PRESSURE: 138 MMHG | DIASTOLIC BLOOD PRESSURE: 88 MMHG

## 2022-06-11 DIAGNOSIS — R50.9 FEVER, UNSPECIFIED FEVER CAUSE: ICD-10-CM

## 2022-06-11 LAB
EXTERNAL QUALITY CONTROL: ABNORMAL
FLUAV+FLUBV AG SPEC QL IA: NEGATIVE
INT CON NEG: NORMAL
INT CON POS: NORMAL
SARS-COV+SARS-COV-2 AG RESP QL IA.RAPID: POSITIVE

## 2022-06-11 PROCEDURE — 87804 INFLUENZA ASSAY W/OPTIC: CPT | Performed by: STUDENT IN AN ORGANIZED HEALTH CARE EDUCATION/TRAINING PROGRAM

## 2022-06-11 PROCEDURE — 99213 OFFICE O/P EST LOW 20 MIN: CPT | Performed by: STUDENT IN AN ORGANIZED HEALTH CARE EDUCATION/TRAINING PROGRAM

## 2022-06-11 PROCEDURE — 87426 SARSCOV CORONAVIRUS AG IA: CPT | Performed by: STUDENT IN AN ORGANIZED HEALTH CARE EDUCATION/TRAINING PROGRAM

## 2022-06-11 ASSESSMENT — FIBROSIS 4 INDEX: FIB4 SCORE: 0.7

## 2022-06-12 NOTE — PROGRESS NOTES
"Subjective:   Tej Hancock is a 41 y.o. male who presents for Fever (FEVER 101/X1DAY)      HPI:  Pleasant 41-year-old male presents the clinic for fever that started last night with a T-max of 101 °F.  Patient states that his throat is also a little bit scratchy but he does not report any other symptoms at this time.  He does mention that he feels a little bit more fatigued than typical.  He is able to maintain adequate oral intake fluids and solids.  He is able to manage his fever which responds adequately to Tylenol.  Patient has not tried ibuprofen at this time.  Denies rash, ear pain, sinus pressure, chest pain, palpitations, lower leg swelling, cough, sputum production, shortness of breath, wheezing, nausea, vomiting, abdominal pain, diarrhea, dizziness, myalgias, or headache.      Medications:    • allopurinol Tabs  • sumatriptan  • triamcinolone acetonide Crea    Allergies: Patient has no known allergies.    Problem List: Tej Hancock does not have any pertinent problems on file.    Surgical History:  Past Surgical History:   Procedure Laterality Date   • TYMPANOPLASTY  8/28/2012    Performed by NAZARIO ABBOTT at SURGERY SAME DAY [facility]S   • OPEN REDUCTION         Past Social Hx: Tej Hancock  reports that he quit smoking about 8 years ago. His smoking use included cigarettes. He has a 10.00 pack-year smoking history. He has never used smokeless tobacco. He reports that he does not drink alcohol and does not use drugs.     Past Family Hx:  Tej Hancock family history includes Other in his brother and brother; Stroke in his maternal grandfather.     Problem list, medications, and allergies reviewed by myself today in Epic.     Objective:     /88 (BP Location: Left arm, Patient Position: Sitting)   Pulse (!) 122   Temp (!) 38.4 °C (101.1 °F) (Temporal)   Resp 18   Ht 1.676 m (5' 6\")   Wt 83.3 kg (183 lb 9.6 oz)   SpO2 96%   BMI 29.63 kg/m²     Physical Exam  Vitals " reviewed.   Constitutional:       General: He is not in acute distress.     Appearance: Normal appearance.   HENT:      Head: Normocephalic.      Right Ear: Tympanic membrane, ear canal and external ear normal.      Left Ear: Tympanic membrane, ear canal and external ear normal.      Nose: Nose normal.   Eyes:      Conjunctiva/sclera: Conjunctivae normal.      Pupils: Pupils are equal, round, and reactive to light.   Cardiovascular:      Rate and Rhythm: Normal rate and regular rhythm.      Pulses: Normal pulses.      Heart sounds: Normal heart sounds. No murmur heard.  Pulmonary:      Effort: Pulmonary effort is normal. No respiratory distress.      Breath sounds: Normal breath sounds. No stridor. No wheezing, rhonchi or rales.   Musculoskeletal:      Cervical back: Normal range of motion.   Lymphadenopathy:      Cervical: No cervical adenopathy.   Skin:     General: Skin is warm and dry.      Capillary Refill: Capillary refill takes less than 2 seconds.      Findings: No erythema, lesion or rash.   Neurological:      General: No focal deficit present.      Mental Status: He is alert and oriented to person, place, and time.         Lab Results/POC Test Results   Results for orders placed or performed in visit on 06/11/22   POCT Influenza A/B   Result Value Ref Range    Rapid Influenza A-B NEGATIVE     Internal Control Positive Valid     Internal Control Negative Valid    POCT SARS-COV Antigen AMOL (Symptomatic only)   Result Value Ref Range    Internal  Valid     SARS-COV ANTIGEN AMOL Positive (A) Negative, Indeterminate, None Detected, Valid, Invalid, Pass           Assessment/Plan:     Diagnosis and associated orders:     1. Fever, unspecified fever cause  POCT Influenza A/B    POCT SARS-COV Antigen AMOL (Symptomatic only)      Comments/MDM:     • POCT COVID was positive.  POCT influenza A/B-.  Patient's presentation and physical exam findings are consistent with an infection of COVID-19.  Patient  does have a fever in clinic of 101.1°F.  Patient was offered Tylenol but he does not want this at this time.  Patient states that he will use this medication at home.  Patient's heart rate is elevated at 102 during exam which is consistent with a normal positive trend with a fever.  Patient is able to maintain adequate oral intake of fluids and solids.  Patient stable at this time and appropriate for home supportive care.  • Discussed antiviral medication with the patient and he does not want this at this time.  • He may continue to use ibuprofen and Tylenol as needed for fever and sore throat.  He may also use OTC cold medications such as DayQuil/NyQuil, OTC Mucinex, warm salt water gargles, home humidifier, nasal saline spray, plenty of oral hydration, and plenty of rest.  • ED precautions were given.  Patient has good understanding of the signs and symptoms of warrant immediate reevaluation.         Differential diagnosis, natural history, supportive care, and indications for immediate follow-up discussed.    Advised the patient to follow-up with the primary care physician for recheck, reevaluation, and consideration of further management.    Please note that this dictation was created using voice recognition software. I have made a reasonable attempt to correct obvious errors, but I expect that there are errors of grammar and possibly content that I did not discover before finalizing the note.    Electronically signed by Mark Lobo PA-C.

## 2022-06-24 ENCOUNTER — OFFICE VISIT (OUTPATIENT)
Dept: MEDICAL GROUP | Facility: PHYSICIAN GROUP | Age: 42
End: 2022-06-24
Payer: COMMERCIAL

## 2022-06-24 VITALS
DIASTOLIC BLOOD PRESSURE: 72 MMHG | BODY MASS INDEX: 29.41 KG/M2 | SYSTOLIC BLOOD PRESSURE: 122 MMHG | HEIGHT: 66 IN | OXYGEN SATURATION: 94 % | HEART RATE: 95 BPM | WEIGHT: 183 LBS | TEMPERATURE: 98.8 F

## 2022-06-24 DIAGNOSIS — R22.0 HEAD LUMP: ICD-10-CM

## 2022-06-24 DIAGNOSIS — L20.84 INTRINSIC ECZEMA: ICD-10-CM

## 2022-06-24 DIAGNOSIS — M1A.00X0 IDIOPATHIC CHRONIC GOUT WITHOUT TOPHUS, UNSPECIFIED SITE: ICD-10-CM

## 2022-06-24 PROCEDURE — 99214 OFFICE O/P EST MOD 30 MIN: CPT | Performed by: FAMILY MEDICINE

## 2022-06-24 ASSESSMENT — FIBROSIS 4 INDEX: FIB4 SCORE: 0.7

## 2022-06-24 NOTE — ASSESSMENT & PLAN NOTE
New issue  Localized to occipital region  For the past 6 mo  Has gone unchanged   Felt like a pimple initially

## 2022-06-24 NOTE — PROGRESS NOTES
"Subjective:     Chief Complaint   Patient presents with   • Bump     Bump back of head/ neck area       HPI:   Tej presents today to discuss the following.    Head lump  New issue  Localized to occipital region  For the past 6 mo  Has gone unchanged   Felt like a pimple initially     Intrinsic eczema  Chronic issue  On kenalog cream     Chronic gout without tophus  Chronic issue  Well on allopurinol       Past Medical History:   Diagnosis Date   • GOUT 12/9/2011   • Hyperlipidemia        Current Outpatient Medications Ordered in Epic   Medication Sig Dispense Refill   • allopurinol (ZYLOPRIM) 300 MG Tab TAKE 1 TABLET BY MOUTH EVERY DAY 90 Tablet 3   • triamcinolone acetonide (KENALOG) 0.1 % Cream Apply twice daily to affected area 80 g 0     No current Epic-ordered facility-administered medications on file.       Allergies:  Patient has no known allergies.    Health Maintenance: Completed    ROS:  Gen: no fevers/chills, no changes in weight  Eyes: no changes in vision  Pulm: no sob, no cough  CV: no chest pain, no palpitations  GI: no nausea/vomiting, no diarrhea      Objective:     Exam:  /72 (BP Location: Left arm, Patient Position: Sitting, BP Cuff Size: Adult)   Pulse 95   Temp 37.1 °C (98.8 °F) (Temporal)   Ht 1.676 m (5' 6\")   Wt 83 kg (183 lb)   SpO2 94%   BMI 29.54 kg/m²  Body mass index is 29.54 kg/m².      Constitutional: Alert, no distress, well-groomed.  Skin: There is an occipital nodule measuring approximately 6 mm in diameter..  Clear defined borders warm, dry, good turgor, no rashes in visible areas.  Eye: Equal, round and reactive, conjunctiva clear, lids normal.  ENMT: Lips without lesions, good dentition, moist mucous membranes.  Neck: Trachea midline, no masses, no thyromegaly.  Respiratory: Unlabored respiratory effort, no cough.  MSK: Normal gait, moves all extremities.  Neuro: Grossly non-focal.   Psych: Alert and oriented x3, normal affect and mood.        Assessment & Plan: "     41 y.o. male with the following -     1. Head lump  New problem.  Likely subcutaneous nodule and benign in nature.  We will continue to monitor for now.  Avoid aggravation.    2. Intrinsic eczema  Continue with Kenalog cream    3. Idiopathic chronic gout without tophus, unspecified site  Continue with allopurinol      No follow-ups on file.    Please note that this dictation was created using voice recognition software. I have made every reasonable attempt to correct obvious errors, but I expect that there are errors of grammar and possibly content that I did not discover before finalizing the note.

## 2022-08-06 NOTE — TELEPHONE ENCOUNTER
"Encounter Date: 8/5/2022       History     Chief Complaint   Patient presents with    Wrist Injury     Work related injury. C/o left wrist pain, felt a "pop" while working. C/o left shoulder pain, injury happened a few days ago      36-year-old male with a significant past medical history presents for severe pain in his left wrist after an accident at work.  He works on a film set and was dragged substance across a muddy field with that hand when he suddenly heard a pop with pain followed in his anterior wrist radiating all the way up to his shoulder.  Pain is worse with movement.  He denies any palliating factors, did not take any medication prior to arrival.  He notes some occasional tingling in his fingers but denies numbness, weakness or other injury.  He is right-hand dominant but is largely ambidextrous.        Review of patient's allergies indicates:  No Known Allergies  History reviewed. No pertinent past medical history.  History reviewed. No pertinent surgical history.  History reviewed. No pertinent family history.  Social History     Tobacco Use    Smoking status: Never Smoker    Smokeless tobacco: Never Used   Substance Use Topics    Alcohol use: Not Currently     Review of Systems   Constitutional: Negative for fever.   HENT: Negative for sore throat.    Respiratory: Negative for shortness of breath.    Cardiovascular: Negative for chest pain.   Gastrointestinal: Negative for nausea.   Genitourinary: Negative for dysuria.   Musculoskeletal: Positive for arthralgias and joint swelling. Negative for back pain, gait problem, myalgias, neck pain and neck stiffness.   Skin: Negative for rash.   Neurological: Negative for weakness and numbness.   Hematological: Does not bruise/bleed easily.       Physical Exam     Initial Vitals [08/05/22 2023]   BP Pulse Resp Temp SpO2   132/85 81 15 98 °F (36.7 °C) 97 %      MAP       --         Physical Exam    Nursing note and vitals reviewed.  Constitutional: He " ----- Message from Tej Hancock sent at 6/1/2021  7:54 AM PDT -----  Regarding: RE: Prescription Question  Contact: [phone]  allopurinol for my uric acid   Thanks      ----- Message -----  From: Medical Assistant Camila LOREDO  Sent: 6/1/21, 7:51 AM  To: Tej Hancock  Subject: RE: Prescription Question    Mike,  What prescription do you need a refill of?  Camila SANCHEZ      ----- Message -----       From:Tej Hancock       Sent:5/28/2021  7:39 PM PDT         To:Physician Jose Sandra    Subject:Prescription Question    Hi Doc, can I get a refill on my prescription.  Thanks   appears well-developed and well-nourished. He is not diaphoretic. No distress.   HENT:   Head: Normocephalic and atraumatic.   Eyes: EOM are normal. Pupils are equal, round, and reactive to light.   Neck: Neck supple.   Normal range of motion.  Cardiovascular: Normal rate, regular rhythm, normal heart sounds and intact distal pulses. Exam reveals no gallop and no friction rub.    No murmur heard.  Pulmonary/Chest: Breath sounds normal. No respiratory distress. He has no wheezes. He has no rales. He exhibits no tenderness.   Musculoskeletal:      Right shoulder: Normal.      Left shoulder: Normal.      Right upper arm: Normal.      Left upper arm: Normal.      Right elbow: Normal.      Left elbow: Normal.      Right forearm: Normal.      Left forearm: Tenderness present.      Right wrist: Normal.      Left wrist: Tenderness and bony tenderness present. No snuff box tenderness. Decreased range of motion.      Right hand: Normal.      Left hand: Swelling present. Decreased range of motion.      Cervical back: Normal range of motion and neck supple.      Comments: Swelling predominantly over the dorsum of the hand and left wrist.  Unable to fully flex and extend the wrist.  Reports pain radiating up the forearm with movement.     Neurological: He is alert and oriented to person, place, and time. He has normal strength. No sensory deficit.   Skin: Skin is warm and dry.   Psychiatric: He has a normal mood and affect.         ED Course   Procedures  Labs Reviewed - No data to display       Imaging Results          X-Ray Wrist Complete Left (Final result)  Result time 08/05/22 22:18:34    Final result by Luis Melchor MD (08/05/22 22:18:34)                 Impression:      No acute fracture or dislocation in the right hand or wrist.    Electronically signed by resident: Lexii Herrmann  Date:    08/05/2022  Time:    22:08    Electronically signed by: Luis Melchor  Date:    08/05/2022  Time:    22:18              Narrative:    EXAMINATION:  XR HAND COMPLETE 3 VIEW LEFT; XR WRIST COMPLETE 3 VIEWS LEFT    CLINICAL HISTORY:  wrist pain; Pain in unspecified wrist    TECHNIQUE:  PA, lateral, and oblique views of the right hand and right wrist were performed.    COMPARISON:  None    FINDINGS:  No fracture or dislocation.  No periarticular osteopenia or erosion.  Cartilage spaces are maintained.  Soft tissues are unremarkable.                               X-Ray Hand 3 View Left (Final result)  Result time 08/05/22 22:18:34    Final result by Luis Melchor MD (08/05/22 22:18:34)                 Impression:      No acute fracture or dislocation in the right hand or wrist.    Electronically signed by resident: Lexii Herrmann  Date:    08/05/2022  Time:    22:08    Electronically signed by: Luis Melchor  Date:    08/05/2022  Time:    22:18             Narrative:    EXAMINATION:  XR HAND COMPLETE 3 VIEW LEFT; XR WRIST COMPLETE 3 VIEWS LEFT    CLINICAL HISTORY:  wrist pain; Pain in unspecified wrist    TECHNIQUE:  PA, lateral, and oblique views of the right hand and right wrist were performed.    COMPARISON:  None    FINDINGS:  No fracture or dislocation.  No periarticular osteopenia or erosion.  Cartilage spaces are maintained.  Soft tissues are unremarkable.                                 Medications   acetaminophen tablet 1,000 mg (1,000 mg Oral Given 8/5/22 2139)   HYDROcodone-acetaminophen 5-325 mg per tablet 1 tablet (1 tablet Oral Given 8/5/22 2251)     Medical Decision Making:   Initial Assessment:   36-year-old male presenting for a wrist injury.  His vitals are normal and he is neurovascularly intact.  Differential Diagnosis:   Initial concern for extensor tendon injury  I think fracture is unlikely given the mechanism  Wrist sprain  Independently Interpreted Test(s):   I have ordered and independently interpreted X-rays - see summary below.       <> Summary of X-Ray Reading(s): No acute fracture  Clinical Tests:    Radiological Study: Ordered and Reviewed  ED Management:  Will do x-rays, give give analgesics, place ice pack, give her a splint and reassess.    No fractures noted on x-rays.  Will discharge with referral to Orthopedics and instruct patient to be seen by Orthopedics or PCP for work clearance. Stressed the importance of follow-up, strict ED return precautions given.  Patient voiced understanding and is comfortable with discharge.                      Clinical Impression:   Final diagnoses:  [M25.539] Wrist pain  [S63.502A] Sprain of left wrist, initial encounter (Primary)          ED Disposition Condition    Discharge Stable        ED Prescriptions     Medication Sig Dispense Start Date End Date Auth. Provider    naproxen (NAPROSYN) 500 MG tablet Take 1 tablet (500 mg total) by mouth 2 (two) times daily as needed (pain). 30 tablet 8/5/2022  Dinorah Montero PA-C        Follow-up Information     Follow up With Specialties Details Why Contact Info Additional Information    [facility] Schedule an appointment as soon as possible for a visit in 1 week  [address]  [facility] [address] [phone] [facility] - Main Building, 5th Floor Please park in [facility] and take Atrium elevator    [facility] - Emergency Dept Emergency Medicine Go to  If symptoms worsen [address]  [facility] [id number] [phone]            Dinorah Montero PA-C  08/06/22 0230

## 2023-01-13 ENCOUNTER — APPOINTMENT (OUTPATIENT)
Dept: MEDICAL GROUP | Facility: PHYSICIAN GROUP | Age: 43
End: 2023-01-13
Payer: COMMERCIAL

## 2023-08-10 ENCOUNTER — DOCUMENTATION (OUTPATIENT)
Dept: HEALTH INFORMATION MANAGEMENT | Facility: OTHER | Age: 43
End: 2023-08-10
Payer: COMMERCIAL

## 2023-08-14 ENCOUNTER — TELEPHONE (OUTPATIENT)
Dept: HEALTH INFORMATION MANAGEMENT | Facility: OTHER | Age: 43
End: 2023-08-14

## 2023-11-03 ENCOUNTER — OFFICE VISIT (OUTPATIENT)
Dept: MEDICAL GROUP | Facility: PHYSICIAN GROUP | Age: 43
End: 2023-11-03
Payer: COMMERCIAL

## 2023-11-03 VITALS
HEIGHT: 66 IN | HEART RATE: 70 BPM | SYSTOLIC BLOOD PRESSURE: 118 MMHG | OXYGEN SATURATION: 95 % | BODY MASS INDEX: 28.61 KG/M2 | WEIGHT: 178 LBS | TEMPERATURE: 98.1 F | DIASTOLIC BLOOD PRESSURE: 70 MMHG

## 2023-11-03 DIAGNOSIS — Z11.59 NEED FOR HEPATITIS C SCREENING TEST: ICD-10-CM

## 2023-11-03 DIAGNOSIS — Z13.1 SCREENING FOR DIABETES MELLITUS (DM): ICD-10-CM

## 2023-11-03 DIAGNOSIS — Z11.3 ENCOUNTER FOR SCREENING EXAMINATION FOR SEXUALLY TRANSMITTED DISEASE: ICD-10-CM

## 2023-11-03 DIAGNOSIS — Z12.11 COLON CANCER SCREENING: ICD-10-CM

## 2023-11-03 DIAGNOSIS — M1A.0710 CHRONIC IDIOPATHIC GOUT INVOLVING TOE OF RIGHT FOOT WITHOUT TOPHUS: ICD-10-CM

## 2023-11-03 DIAGNOSIS — G89.29 CHRONIC LEFT-SIDED LOW BACK PAIN WITH LEFT-SIDED SCIATICA: Primary | ICD-10-CM

## 2023-11-03 DIAGNOSIS — E78.00 PURE HYPERCHOLESTEROLEMIA: ICD-10-CM

## 2023-11-03 DIAGNOSIS — M54.42 CHRONIC LEFT-SIDED LOW BACK PAIN WITH LEFT-SIDED SCIATICA: Primary | ICD-10-CM

## 2023-11-03 DIAGNOSIS — Z23 NEED FOR VACCINATION: ICD-10-CM

## 2023-11-03 PROCEDURE — 90471 IMMUNIZATION ADMIN: CPT | Performed by: NURSE PRACTITIONER

## 2023-11-03 PROCEDURE — 90686 IIV4 VACC NO PRSV 0.5 ML IM: CPT | Performed by: NURSE PRACTITIONER

## 2023-11-03 PROCEDURE — 3074F SYST BP LT 130 MM HG: CPT | Performed by: NURSE PRACTITIONER

## 2023-11-03 PROCEDURE — 3078F DIAST BP <80 MM HG: CPT | Performed by: NURSE PRACTITIONER

## 2023-11-03 PROCEDURE — 99214 OFFICE O/P EST MOD 30 MIN: CPT | Mod: 25 | Performed by: NURSE PRACTITIONER

## 2023-11-03 RX ORDER — ALLOPURINOL 300 MG/1
300 TABLET ORAL DAILY
Qty: 90 TABLET | Refills: 3 | Status: SHIPPED | OUTPATIENT
Start: 2023-11-03

## 2023-11-03 ASSESSMENT — ENCOUNTER SYMPTOMS
SHORTNESS OF BREATH: 0
ABDOMINAL PAIN: 0
HEADACHES: 0
DIZZINESS: 0
NAUSEA: 0
VOMITING: 0

## 2023-11-03 ASSESSMENT — FIBROSIS 4 INDEX: FIB4 SCORE: 0.74

## 2023-11-03 ASSESSMENT — PATIENT HEALTH QUESTIONNAIRE - PHQ9: CLINICAL INTERPRETATION OF PHQ2 SCORE: 0

## 2023-11-03 NOTE — PROGRESS NOTES
Subjective:     CC: Establish care and back pain    HPI:   Tej is a 43 y.o. male presents to establish care. Previous PCP was Dr. Sandra. Specialists: None. 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 His last flare up was years ago.   He has been on allopurinol since 2014.   States that he would get flare ups in his R toe  Does not drink alcohol.           Health Maintenance/Immunizations: Completed    Current Outpatient Medications   Medication Sig Dispense Refill    allopurinol (ZYLOPRIM) 300 MG Tab Take 1 Tablet by mouth every day. 90 Tablet 3     No current facility-administered medications for this visit.       Past Medical History:   Diagnosis Date    GOUT 12/9/2011    Hyperlipidemia        Past Surgical History:   Procedure Laterality Date    TYMPANOPLASTY  8/28/2012    Performed by NAZARIO ABBOTT at SURGERY SAME DAY [facility]  "ORS    OPEN REDUCTION          Family History   Problem Relation Age of Onset    Stroke Maternal Grandfather     Other Brother     Other Brother     Cancer Neg Hx     Diabetes Neg Hx        Social History     Tobacco Use    Smoking status: Former     Current packs/day: 0.00     Average packs/day: 0.5 packs/day for 20.0 years (10.0 ttl pk-yrs)     Types: Cigarettes     Start date: 10/22/1993     Quit date: 10/22/2013     Years since quitting: 10.0    Smokeless tobacco: Never    Tobacco comments:     1 pack every 2 days   Vaping Use    Vaping Use: Never used   Substance Use Topics    Alcohol use: No     Alcohol/week: 0.0 oz    Drug use: No        Review of Systems   Respiratory:  Negative for shortness of breath.    Cardiovascular:  Negative for chest pain.   Gastrointestinal:  Negative for abdominal pain, nausea and vomiting.   Neurological:  Negative for dizziness and headaches.        Objective:     /70 (BP Location: Right arm, Patient Position: Sitting, BP Cuff Size: Adult)   Pulse 70   Temp 36.7 °C (98.1 °F) (Temporal)   Ht 1.676 m (5' 6\")   Wt 80.7 kg (178 lb)   SpO2 95%   BMI 28.73 kg/m²  Body mass index is 28.73 kg/m².     Physical Exam  Constitutional:       Appearance: Normal appearance.   Eyes:      Conjunctiva/sclera: Conjunctivae normal.      Pupils: Pupils are equal, round, and reactive to light.   Cardiovascular:      Rate and Rhythm: Normal rate and regular rhythm.      Heart sounds: Normal heart sounds. No murmur heard.  Pulmonary:      Effort: Pulmonary effort is normal. No respiratory distress.      Breath sounds: Normal breath sounds.   Abdominal:      Tenderness: There is no abdominal tenderness.   Musculoskeletal:      Right lower leg: No edema.      Left lower leg: No edema.      Comments: Back: Full ROM, 5/5 LE strength, sensation intact bilaterally in LE, no TTP over spinous processes, paraspinals TTP, SI joint TTP, straight leg raise positive on the L (reports mild pain), Kristyn " test negative bilaterally   Lymphadenopathy:      Cervical: No cervical adenopathy.   Skin:     General: Skin is warm and dry.   Neurological:      General: No focal deficit present.      Mental Status: He is alert and oriented to person, place, and time.   Psychiatric:         Mood and Affect: Mood normal.         Behavior: Behavior normal.          Assessment and Plan:   43 y.o. male with the following -    1. Chronic left-sided low back pain with left-sided sciatica  Chronic. No red flags. I suspect is low back pain is musculoskeletal in nature. Imaging not necessary at this point.  Discussed conservative treatment including ice/heat, rest, activity modifications. Recommended trial of PT. He preferred home exercises. A low back exercises handout was provided. I recommended he wear a back brace when working. RTC precautions and red flags discussed.   - CBC WITHOUT DIFFERENTIAL; Future    2. Chronic idiopathic gout involving toe of right foot without tophus  Chronic, stable.  He has not had a flareup in years.  Continue current regimen as listed below.  We will monitor his kidney function and his uric acid level.  - Comp Metabolic Panel; Future  - URIC ACID; Future  - allopurinol (ZYLOPRIM) 300 MG Tab; Take 1 Tablet by mouth every day.  Dispense: 90 Tablet; Refill: 3    3. Pure hypercholesterolemia  - Lipid Profile; Future    4. Colon cancer screening  - COLOGUARD (FIT DNA)    5. Need for hepatitis C screening test  - HEP C VIRUS ANTIBODY; Future    6. Encounter for screening examination for sexually transmitted disease  - HIV AG/AB COMBO ASSAY SCREENING; Future    7. Need for vaccination  - INFLUENZA VACCINE QUAD INJ (PF)    8. Screening for diabetes mellitus (DM)  - HEMOGLOBIN A1C; Future    Return if symptoms worsen or fail to improve.    Please note that this dictation was created using voice recognition software. I have made every reasonable attempt to correct obvious errors, but I expect that there are errors  of grammar and possibly content that I did not discover before finalizing the note.

## 2023-12-05 ENCOUNTER — HOSPITAL ENCOUNTER (OUTPATIENT)
Dept: LAB | Facility: MEDICAL CENTER | Age: 43
End: 2023-12-05
Attending: NURSE PRACTITIONER
Payer: COMMERCIAL

## 2023-12-05 DIAGNOSIS — M1A.0710 CHRONIC IDIOPATHIC GOUT INVOLVING TOE OF RIGHT FOOT WITHOUT TOPHUS: ICD-10-CM

## 2023-12-05 DIAGNOSIS — G89.29 CHRONIC LEFT-SIDED LOW BACK PAIN WITH LEFT-SIDED SCIATICA: ICD-10-CM

## 2023-12-05 DIAGNOSIS — Z13.1 SCREENING FOR DIABETES MELLITUS (DM): ICD-10-CM

## 2023-12-05 DIAGNOSIS — Z11.3 ENCOUNTER FOR SCREENING EXAMINATION FOR SEXUALLY TRANSMITTED DISEASE: ICD-10-CM

## 2023-12-05 DIAGNOSIS — E78.00 PURE HYPERCHOLESTEROLEMIA: ICD-10-CM

## 2023-12-05 DIAGNOSIS — M54.42 CHRONIC LEFT-SIDED LOW BACK PAIN WITH LEFT-SIDED SCIATICA: ICD-10-CM

## 2023-12-05 DIAGNOSIS — Z11.59 NEED FOR HEPATITIS C SCREENING TEST: ICD-10-CM

## 2023-12-05 LAB
ALBUMIN SERPL BCP-MCNC: 4.4 G/DL (ref 3.2–4.9)
ALBUMIN/GLOB SERPL: 1.6 G/DL
ALP SERPL-CCNC: 51 U/L (ref 30–99)
ALT SERPL-CCNC: 32 U/L (ref 2–50)
ANION GAP SERPL CALC-SCNC: 10 MMOL/L (ref 7–16)
AST SERPL-CCNC: 19 U/L (ref 12–45)
BILIRUB SERPL-MCNC: 0.7 MG/DL (ref 0.1–1.5)
BUN SERPL-MCNC: 15 MG/DL (ref 8–22)
CALCIUM ALBUM COR SERPL-MCNC: 9 MG/DL (ref 8.5–10.5)
CALCIUM SERPL-MCNC: 9.3 MG/DL (ref 8.5–10.5)
CHLORIDE SERPL-SCNC: 102 MMOL/L (ref 96–112)
CHOLEST SERPL-MCNC: 210 MG/DL (ref 100–199)
CO2 SERPL-SCNC: 26 MMOL/L (ref 20–33)
CREAT SERPL-MCNC: 0.95 MG/DL (ref 0.5–1.4)
ERYTHROCYTE [DISTWIDTH] IN BLOOD BY AUTOMATED COUNT: 40.6 FL (ref 35.9–50)
EST. AVERAGE GLUCOSE BLD GHB EST-MCNC: 108 MG/DL
FASTING STATUS PATIENT QL REPORTED: NORMAL
GFR SERPLBLD CREATININE-BSD FMLA CKD-EPI: 100 ML/MIN/1.73 M 2
GLOBULIN SER CALC-MCNC: 2.7 G/DL (ref 1.9–3.5)
GLUCOSE SERPL-MCNC: 97 MG/DL (ref 65–99)
HBA1C MFR BLD: 5.4 % (ref 4–5.6)
HCT VFR BLD AUTO: 47.7 % (ref 42–52)
HCV AB SER QL: NORMAL
HDLC SERPL-MCNC: 47 MG/DL
HGB BLD-MCNC: 16.1 G/DL (ref 14–18)
HIV 1+2 AB+HIV1 P24 AG SERPL QL IA: NORMAL
LDLC SERPL CALC-MCNC: 130 MG/DL
MCH RBC QN AUTO: 30.7 PG (ref 27–33)
MCHC RBC AUTO-ENTMCNC: 33.8 G/DL (ref 32.3–36.5)
MCV RBC AUTO: 91 FL (ref 81.4–97.8)
PLATELET # BLD AUTO: 242 K/UL (ref 164–446)
PMV BLD AUTO: 11.3 FL (ref 9–12.9)
POTASSIUM SERPL-SCNC: 4.2 MMOL/L (ref 3.6–5.5)
PROT SERPL-MCNC: 7.1 G/DL (ref 6–8.2)
RBC # BLD AUTO: 5.24 M/UL (ref 4.7–6.1)
SODIUM SERPL-SCNC: 138 MMOL/L (ref 135–145)
TRIGL SERPL-MCNC: 165 MG/DL (ref 0–149)
URATE SERPL-MCNC: 6.4 MG/DL (ref 2.5–8.3)
WBC # BLD AUTO: 7 K/UL (ref 4.8–10.8)

## 2023-12-05 PROCEDURE — 36415 COLL VENOUS BLD VENIPUNCTURE: CPT

## 2023-12-05 PROCEDURE — 80053 COMPREHEN METABOLIC PANEL: CPT

## 2023-12-05 PROCEDURE — 86803 HEPATITIS C AB TEST: CPT

## 2023-12-05 PROCEDURE — 84550 ASSAY OF BLOOD/URIC ACID: CPT

## 2023-12-05 PROCEDURE — 83036 HEMOGLOBIN GLYCOSYLATED A1C: CPT

## 2023-12-05 PROCEDURE — 80061 LIPID PANEL: CPT

## 2023-12-05 PROCEDURE — 87389 HIV-1 AG W/HIV-1&-2 AB AG IA: CPT

## 2023-12-05 PROCEDURE — 85027 COMPLETE CBC AUTOMATED: CPT

## 2024-01-16 NOTE — ASSESSMENT & PLAN NOTE
New issue  Started feeling lightheaded 2 weeks ago  Denies the room spinning   He feels like he has head pressure and feels nauseous   Denies any visions   He does not suffer from headache  Denies waking up from the headache   Has not taken any medicine for this    Yes

## 2024-05-01 ENCOUNTER — TELEPHONE (OUTPATIENT)
Dept: HEALTH INFORMATION MANAGEMENT | Facility: OTHER | Age: 44
End: 2024-05-01
Payer: COMMERCIAL

## 2024-05-03 ENCOUNTER — APPOINTMENT (OUTPATIENT)
Dept: MEDICAL GROUP | Facility: PHYSICIAN GROUP | Age: 44
End: 2024-05-03
Payer: COMMERCIAL

## 2024-07-19 ENCOUNTER — APPOINTMENT (OUTPATIENT)
Dept: MEDICAL GROUP | Facility: PHYSICIAN GROUP | Age: 44
End: 2024-07-19
Payer: COMMERCIAL

## 2024-08-02 ENCOUNTER — TELEPHONE (OUTPATIENT)
Dept: HEALTH INFORMATION MANAGEMENT | Facility: OTHER | Age: 44
End: 2024-08-02
Payer: COMMERCIAL

## 2024-10-30 DIAGNOSIS — M1A.0710 CHRONIC IDIOPATHIC GOUT INVOLVING TOE OF RIGHT FOOT WITHOUT TOPHUS: ICD-10-CM

## 2024-10-30 RX ORDER — ALLOPURINOL 300 MG/1
300 TABLET ORAL DAILY
Qty: 90 TABLET | Refills: 0 | Status: SHIPPED | OUTPATIENT
Start: 2024-10-30

## 2024-11-01 ENCOUNTER — APPOINTMENT (OUTPATIENT)
Dept: MEDICAL GROUP | Facility: PHYSICIAN GROUP | Age: 44
End: 2024-11-01
Payer: COMMERCIAL

## 2024-11-01 VITALS
SYSTOLIC BLOOD PRESSURE: 114 MMHG | DIASTOLIC BLOOD PRESSURE: 72 MMHG | OXYGEN SATURATION: 97 % | HEART RATE: 67 BPM | WEIGHT: 176 LBS | HEIGHT: 66 IN | BODY MASS INDEX: 28.28 KG/M2 | TEMPERATURE: 98.4 F

## 2024-11-01 DIAGNOSIS — M54.42 CHRONIC LEFT-SIDED LOW BACK PAIN WITH LEFT-SIDED SCIATICA: ICD-10-CM

## 2024-11-01 DIAGNOSIS — Z00.00 PREVENTATIVE HEALTH CARE: ICD-10-CM

## 2024-11-01 DIAGNOSIS — M1A.0710 CHRONIC IDIOPATHIC GOUT INVOLVING TOE OF RIGHT FOOT WITHOUT TOPHUS: ICD-10-CM

## 2024-11-01 DIAGNOSIS — G89.29 CHRONIC LEFT-SIDED LOW BACK PAIN WITH LEFT-SIDED SCIATICA: ICD-10-CM

## 2024-11-01 DIAGNOSIS — M77.01 MEDIAL EPICONDYLITIS OF BOTH ELBOWS: ICD-10-CM

## 2024-11-01 DIAGNOSIS — M77.02 MEDIAL EPICONDYLITIS OF BOTH ELBOWS: ICD-10-CM

## 2024-11-01 PROCEDURE — 3078F DIAST BP <80 MM HG: CPT | Performed by: STUDENT IN AN ORGANIZED HEALTH CARE EDUCATION/TRAINING PROGRAM

## 2024-11-01 PROCEDURE — 99214 OFFICE O/P EST MOD 30 MIN: CPT | Performed by: STUDENT IN AN ORGANIZED HEALTH CARE EDUCATION/TRAINING PROGRAM

## 2024-11-01 PROCEDURE — 3074F SYST BP LT 130 MM HG: CPT | Performed by: STUDENT IN AN ORGANIZED HEALTH CARE EDUCATION/TRAINING PROGRAM

## 2024-11-01 SDOH — ECONOMIC STABILITY: TRANSPORTATION INSECURITY
IN THE PAST 12 MONTHS, HAS LACK OF RELIABLE TRANSPORTATION KEPT YOU FROM MEDICAL APPOINTMENTS, MEETINGS, WORK OR FROM GETTING THINGS NEEDED FOR DAILY LIVING?: PATIENT DECLINED

## 2024-11-01 SDOH — ECONOMIC STABILITY: INCOME INSECURITY: IN THE LAST 12 MONTHS, WAS THERE A TIME WHEN YOU WERE NOT ABLE TO PAY THE MORTGAGE OR RENT ON TIME?: PATIENT DECLINED

## 2024-11-01 SDOH — ECONOMIC STABILITY: INCOME INSECURITY: HOW HARD IS IT FOR YOU TO PAY FOR THE VERY BASICS LIKE FOOD, HOUSING, MEDICAL CARE, AND HEATING?: PATIENT DECLINED

## 2024-11-01 SDOH — ECONOMIC STABILITY: TRANSPORTATION INSECURITY
IN THE PAST 12 MONTHS, HAS THE LACK OF TRANSPORTATION KEPT YOU FROM MEDICAL APPOINTMENTS OR FROM GETTING MEDICATIONS?: PATIENT DECLINED

## 2024-11-01 SDOH — HEALTH STABILITY: PHYSICAL HEALTH
ON AVERAGE, HOW MANY DAYS PER WEEK DO YOU ENGAGE IN MODERATE TO STRENUOUS EXERCISE (LIKE A BRISK WALK)?: PATIENT DECLINED

## 2024-11-01 SDOH — ECONOMIC STABILITY: FOOD INSECURITY: WITHIN THE PAST 12 MONTHS, THE FOOD YOU BOUGHT JUST DIDN'T LAST AND YOU DIDN'T HAVE MONEY TO GET MORE.: PATIENT DECLINED

## 2024-11-01 SDOH — HEALTH STABILITY: PHYSICAL HEALTH: ON AVERAGE, HOW MANY MINUTES DO YOU ENGAGE IN EXERCISE AT THIS LEVEL?: PATIENT DECLINED

## 2024-11-01 SDOH — ECONOMIC STABILITY: HOUSING INSECURITY
IN THE LAST 12 MONTHS, WAS THERE A TIME WHEN YOU DID NOT HAVE A STEADY PLACE TO SLEEP OR SLEPT IN A SHELTER (INCLUDING NOW)?: PATIENT DECLINED

## 2024-11-01 SDOH — HEALTH STABILITY: MENTAL HEALTH
STRESS IS WHEN SOMEONE FEELS TENSE, NERVOUS, ANXIOUS, OR CAN'T SLEEP AT NIGHT BECAUSE THEIR MIND IS TROUBLED. HOW STRESSED ARE YOU?: PATIENT DECLINED

## 2024-11-01 SDOH — ECONOMIC STABILITY: FOOD INSECURITY: WITHIN THE PAST 12 MONTHS, YOU WORRIED THAT YOUR FOOD WOULD RUN OUT BEFORE YOU GOT MONEY TO BUY MORE.: PATIENT DECLINED

## 2024-11-01 SDOH — ECONOMIC STABILITY: TRANSPORTATION INSECURITY
IN THE PAST 12 MONTHS, HAS LACK OF TRANSPORTATION KEPT YOU FROM MEETINGS, WORK, OR FROM GETTING THINGS NEEDED FOR DAILY LIVING?: PATIENT DECLINED

## 2024-11-01 ASSESSMENT — SOCIAL DETERMINANTS OF HEALTH (SDOH)
HOW OFTEN DO YOU ATTENT MEETINGS OF THE CLUB OR ORGANIZATION YOU BELONG TO?: PATIENT DECLINED
IN A TYPICAL WEEK, HOW MANY TIMES DO YOU TALK ON THE PHONE WITH FAMILY, FRIENDS, OR NEIGHBORS?: PATIENT DECLINED
HOW OFTEN DO YOU GET TOGETHER WITH FRIENDS OR RELATIVES?: PATIENT DECLINED
ARE YOU MARRIED, WIDOWED, DIVORCED, SEPARATED, NEVER MARRIED, OR LIVING WITH A PARTNER?: PATIENT DECLINED
HOW OFTEN DO YOU ATTEND CHURCH OR RELIGIOUS SERVICES?: PATIENT DECLINED
HOW OFTEN DO YOU HAVE A DRINK CONTAINING ALCOHOL: PATIENT DECLINED
IN A TYPICAL WEEK, HOW MANY TIMES DO YOU TALK ON THE PHONE WITH FAMILY, FRIENDS, OR NEIGHBORS?: PATIENT DECLINED
HOW OFTEN DO YOU GET TOGETHER WITH FRIENDS OR RELATIVES?: PATIENT DECLINED
HOW OFTEN DO YOU ATTEND CHURCH OR RELIGIOUS SERVICES?: PATIENT DECLINED
HOW OFTEN DO YOU ATTENT MEETINGS OF THE CLUB OR ORGANIZATION YOU BELONG TO?: PATIENT DECLINED
DO YOU BELONG TO ANY CLUBS OR ORGANIZATIONS SUCH AS CHURCH GROUPS UNIONS, FRATERNAL OR ATHLETIC GROUPS, OR SCHOOL GROUPS?: PATIENT DECLINED
HOW HARD IS IT FOR YOU TO PAY FOR THE VERY BASICS LIKE FOOD, HOUSING, MEDICAL CARE, AND HEATING?: PATIENT DECLINED
DO YOU BELONG TO ANY CLUBS OR ORGANIZATIONS SUCH AS CHURCH GROUPS UNIONS, FRATERNAL OR ATHLETIC GROUPS, OR SCHOOL GROUPS?: PATIENT DECLINED
IN THE PAST 12 MONTHS, HAS THE ELECTRIC, GAS, OIL, OR WATER COMPANY THREATENED TO SHUT OFF SERVICE IN YOUR HOME?: PATIENT DECLINED
WITHIN THE PAST 12 MONTHS, YOU WORRIED THAT YOUR FOOD WOULD RUN OUT BEFORE YOU GOT THE MONEY TO BUY MORE: PATIENT DECLINED
ARE YOU MARRIED, WIDOWED, DIVORCED, SEPARATED, NEVER MARRIED, OR LIVING WITH A PARTNER?: PATIENT DECLINED
HOW OFTEN DO YOU HAVE SIX OR MORE DRINKS ON ONE OCCASION: PATIENT DECLINED
HOW MANY DRINKS CONTAINING ALCOHOL DO YOU HAVE ON A TYPICAL DAY WHEN YOU ARE DRINKING: PATIENT DECLINED

## 2024-11-01 ASSESSMENT — LIFESTYLE VARIABLES
HOW OFTEN DO YOU HAVE SIX OR MORE DRINKS ON ONE OCCASION: PATIENT DECLINED
SKIP TO QUESTIONS 9-10: 0
AUDIT-C TOTAL SCORE: -1
HOW OFTEN DO YOU HAVE A DRINK CONTAINING ALCOHOL: PATIENT DECLINED
HOW MANY STANDARD DRINKS CONTAINING ALCOHOL DO YOU HAVE ON A TYPICAL DAY: PATIENT DECLINED

## 2024-11-01 ASSESSMENT — ENCOUNTER SYMPTOMS
SHORTNESS OF BREATH: 0
HEADACHES: 0
CHILLS: 0
FEVER: 0
DIZZINESS: 0

## 2024-11-01 ASSESSMENT — PATIENT HEALTH QUESTIONNAIRE - PHQ9: CLINICAL INTERPRETATION OF PHQ2 SCORE: 0

## 2024-11-01 ASSESSMENT — FIBROSIS 4 INDEX: FIB4 SCORE: 0.64

## 2024-11-01 NOTE — PROGRESS NOTES
Verbal consent was acquired by the patient to use [employer] ambient listening note generation during this visit.    Subjective:     HPI:   History of Present Illness  The patient presents for the establishment of care.    He reports a history of gout, for which he takes allopurinol 300 mg daily. Has been on this medication since 2014. Last flare was many years ago. Tries to also manage with diet. Does not drink alcohol.    He has noticed some discomfort of both elbows. Localizes pain to the ulnar aspect of the elbows. Does not always bother him but sometimes flares up when he does a lot of lifting or when he bumps his elbow on something. He feels as though his elbows are swollen but has not observed significant swelling.     He also mentions a long-standing issue with left-sided back pain, which he believes flared up 1-2 years ago when he tweaked his back. He describes the pain as intermittent, occasionally radiating down his leg, but reports no numbness, tingling, or weakness in his legs. He also reports no bowel or bladder incontinence. He manages his pain with over-the-counter medications like ibuprofen and Tylenol, and occasionally uses a back brace when lifting heavy objects. Reports he was diagnosed with a pinched nerve many years ago and did physical therapy at that time.          FAMILY HISTORY  His mother passed away on 10/13/2024 with dementia and Parkinson's disease.      Past medical, surgical, family, and social history were reviewed and updated.     Medications:    Current Outpatient Medications:     allopurinol (ZYLOPRIM) 300 MG Tab, TAKE 1 TABLET BY MOUTH EVERY DAY, Disp: 90 Tablet, Rfl: 0    Allergies:  Patient has no known allergies.      Health Maintenance: Completed    ROS:  Review of Systems   Constitutional:  Negative for chills and fever.   Respiratory:  Negative for shortness of breath.    Cardiovascular:  Negative for chest pain.   Neurological:  Negative for dizziness and headaches.  "      Objective:     Exam:  /72 (BP Location: Left arm, Patient Position: Sitting, BP Cuff Size: Adult)   Pulse 67   Temp 36.9 °C (98.4 °F) (Temporal)   Ht 1.676 m (5' 6\")   Wt 79.8 kg (176 lb)   SpO2 97%   BMI 28.41 kg/m²  Body mass index is 28.41 kg/m².    Physical Exam  Constitutional:       General: He is not in acute distress.  HENT:      Mouth/Throat:      Mouth: Mucous membranes are moist.   Eyes:      Extraocular Movements: Extraocular movements intact.   Cardiovascular:      Rate and Rhythm: Normal rate and regular rhythm.      Heart sounds: No murmur heard.     No friction rub. No gallop.   Pulmonary:      Effort: Pulmonary effort is normal.      Breath sounds: No wheezing, rhonchi or rales.   Musculoskeletal:      Cervical back: Neck supple.   Skin:     General: Skin is warm and dry.   Neurological:      Mental Status: He is alert.   Psychiatric:         Mood and Affect: Mood normal.         Results      Assessment & Plan:     1. Chronic idiopathic gout involving toe of right foot without tophus  URIC ACID      2. Medial epicondylitis of both elbows  Referral to Physical Therapy    CANCELED: Referral to Physical Therapy      3. Chronic left-sided low back pain with left-sided sciatica  Referral to Physical Therapy    CANCELED: Referral to Physical Therapy      4. Preventative health care  Comp Metabolic Panel    Lipid Profile          Assessment & Plan  1. Gout.  Chronic, controlled condition. Continue allopurinol. Continue low purine diet, avoid alcohol.     2. Bilateral elbow pain  The patient has some focal tenderness over the area of the aspect of the elbows bilaterally. There is no significant tenderness on exam. Suspect he may have medial epicondylitis from overuse. Continue NSAIDs as needed. Referral to physical therapy has been made.     3. Chronic Back Pain.  This is a chronic, intermittent problem.  He experiences intermittent pain without numbness, tingling, or weakness. Physical " therapy was recommended to help manage the symptoms. Referral placed. Continue NSAIDs as needed.              Return in about 1 year (around 11/1/2025) for annual. Sooner PRN.      Please note that this dictation was created using voice recognition software. I have made every reasonable attempt to correct obvious errors, but I expect that there are errors of grammar and possibly content that I did not discover before finalizing the note.